# Patient Record
Sex: MALE | Race: WHITE | NOT HISPANIC OR LATINO | Employment: OTHER | URBAN - METROPOLITAN AREA
[De-identification: names, ages, dates, MRNs, and addresses within clinical notes are randomized per-mention and may not be internally consistent; named-entity substitution may affect disease eponyms.]

---

## 2018-01-23 ENCOUNTER — CONVERSION ENCOUNTER (OUTPATIENT)
Dept: PODIATRY | Facility: CLINIC | Age: 83
End: 2018-01-23

## 2018-01-23 ENCOUNTER — PROCEDURE VISIT CONVERTED (OUTPATIENT)
Dept: PODIATRY | Facility: CLINIC | Age: 83
End: 2018-01-23
Attending: PODIATRIST

## 2018-04-04 ENCOUNTER — CONVERSION ENCOUNTER (OUTPATIENT)
Dept: PODIATRY | Facility: CLINIC | Age: 83
End: 2018-04-04

## 2018-04-04 ENCOUNTER — PROCEDURE VISIT CONVERTED (OUTPATIENT)
Dept: PODIATRY | Facility: CLINIC | Age: 83
End: 2018-04-04
Attending: PODIATRIST

## 2018-06-27 ENCOUNTER — CONVERSION ENCOUNTER (OUTPATIENT)
Dept: PODIATRY | Facility: CLINIC | Age: 83
End: 2018-06-27

## 2018-06-27 ENCOUNTER — PROCEDURE VISIT CONVERTED (OUTPATIENT)
Dept: PODIATRY | Facility: CLINIC | Age: 83
End: 2018-06-27
Attending: PODIATRIST

## 2018-09-24 ENCOUNTER — CONVERSION ENCOUNTER (OUTPATIENT)
Dept: PODIATRY | Facility: CLINIC | Age: 83
End: 2018-09-24

## 2018-09-24 ENCOUNTER — OFFICE VISIT CONVERTED (OUTPATIENT)
Dept: PODIATRY | Facility: CLINIC | Age: 83
End: 2018-09-24
Attending: PODIATRIST

## 2018-11-13 ENCOUNTER — OFFICE VISIT CONVERTED (OUTPATIENT)
Dept: INTERNAL MEDICINE | Facility: CLINIC | Age: 83
End: 2018-11-13
Attending: NURSE PRACTITIONER

## 2018-11-13 ENCOUNTER — CONVERSION ENCOUNTER (OUTPATIENT)
Dept: INTERNAL MEDICINE | Facility: CLINIC | Age: 83
End: 2018-11-13

## 2018-12-14 ENCOUNTER — OFFICE VISIT CONVERTED (OUTPATIENT)
Dept: INTERNAL MEDICINE | Facility: CLINIC | Age: 83
End: 2018-12-14
Attending: NURSE PRACTITIONER

## 2018-12-14 ENCOUNTER — CONVERSION ENCOUNTER (OUTPATIENT)
Dept: OTHER | Facility: HOSPITAL | Age: 83
End: 2018-12-14

## 2018-12-17 ENCOUNTER — PROCEDURE VISIT CONVERTED (OUTPATIENT)
Dept: PODIATRY | Facility: CLINIC | Age: 83
End: 2018-12-17
Attending: PODIATRIST

## 2019-01-07 ENCOUNTER — OFFICE VISIT CONVERTED (OUTPATIENT)
Dept: INTERNAL MEDICINE | Facility: CLINIC | Age: 84
End: 2019-01-07
Attending: NURSE PRACTITIONER

## 2019-02-13 ENCOUNTER — OFFICE VISIT CONVERTED (OUTPATIENT)
Dept: NEUROLOGY | Facility: CLINIC | Age: 84
End: 2019-02-13
Attending: PSYCHIATRY & NEUROLOGY

## 2019-03-04 ENCOUNTER — HOSPITAL ENCOUNTER (OUTPATIENT)
Dept: OTHER | Facility: HOSPITAL | Age: 84
Discharge: HOME | End: 2019-03-04
Attending: NURSE PRACTITIONER

## 2019-03-04 LAB
ALBUMIN SERPL-MCNC: 4.3 G/DL (ref 3.5–5)
ALBUMIN/GLOB SERPL: 1.6 {RATIO} (ref 1.4–2.6)
ALP SERPL-CCNC: 118 U/L (ref 56–155)
ALT SERPL-CCNC: 31 U/L (ref 10–40)
ANION GAP SERPL CALC-SCNC: 16 MMOL/L (ref 8–19)
AST SERPL-CCNC: 20 U/L (ref 15–50)
BASOPHILS # BLD AUTO: 0.04 10*3/UL (ref 0–0.2)
BASOPHILS NFR BLD AUTO: 0.6 % (ref 0–3)
BILIRUB SERPL-MCNC: 0.28 MG/DL (ref 0.2–1.3)
BUN SERPL-MCNC: 17 MG/DL (ref 5–25)
BUN/CREAT SERPL: 16 {RATIO} (ref 6–20)
CALCIUM SERPL-MCNC: 9.6 MG/DL (ref 8.7–10.4)
CHLORIDE SERPL-SCNC: 104 MMOL/L (ref 99–111)
CHOLEST SERPL-MCNC: 161 MG/DL (ref 107–200)
CHOLEST/HDLC SERPL: 2.5 {RATIO} (ref 3–6)
CONV ABS IMM GRAN: 0.01 10*3/UL (ref 0–0.2)
CONV CO2: 29 MMOL/L (ref 22–32)
CONV IMMATURE GRAN: 0.2 % (ref 0–1.8)
CONV TOTAL PROTEIN: 7 G/DL (ref 6.3–8.2)
CREAT UR-MCNC: 1.05 MG/DL (ref 0.7–1.2)
DEPRECATED RDW RBC AUTO: 46.4 FL (ref 35.1–43.9)
EOSINOPHIL # BLD AUTO: 0.42 10*3/UL (ref 0–0.7)
EOSINOPHIL # BLD AUTO: 6.5 % (ref 0–7)
ERYTHROCYTE [DISTWIDTH] IN BLOOD BY AUTOMATED COUNT: 12.5 % (ref 11.6–14.4)
EST. AVERAGE GLUCOSE BLD GHB EST-MCNC: 131 MG/DL
GFR SERPLBLD BASED ON 1.73 SQ M-ARVRAT: >60 ML/MIN/{1.73_M2}
GLOBULIN UR ELPH-MCNC: 2.7 G/DL (ref 2–3.5)
GLUCOSE SERPL-MCNC: 105 MG/DL (ref 70–99)
HBA1C MFR BLD: 14.3 G/DL (ref 14–18)
HBA1C MFR BLD: 6.2 % (ref 3.5–5.7)
HCT VFR BLD AUTO: 43.3 % (ref 42–52)
HDLC SERPL-MCNC: 65 MG/DL (ref 40–60)
LDLC SERPL CALC-MCNC: 71 MG/DL (ref 70–100)
LYMPHOCYTES # BLD AUTO: 2.51 10*3/UL (ref 1–5)
MCH RBC QN AUTO: 33.1 PG (ref 27–31)
MCHC RBC AUTO-ENTMCNC: 33 G/DL (ref 33–37)
MCV RBC AUTO: 100.2 FL (ref 80–96)
MONOCYTES # BLD AUTO: 0.44 10*3/UL (ref 0.2–1.2)
MONOCYTES NFR BLD AUTO: 6.8 % (ref 3–10)
NEUTROPHILS # BLD AUTO: 3.09 10*3/UL (ref 2–8)
NEUTROPHILS NFR BLD AUTO: 47.3 % (ref 30–85)
NRBC CBCN: 0 % (ref 0–0.7)
OSMOLALITY SERPL CALC.SUM OF ELEC: 300 MOSM/KG (ref 273–304)
PLATELET # BLD AUTO: 143 10*3/UL (ref 130–400)
PMV BLD AUTO: 10.8 FL (ref 9.4–12.4)
POTASSIUM SERPL-SCNC: 5 MMOL/L (ref 3.5–5.3)
RBC # BLD AUTO: 4.32 10*6/UL (ref 4.7–6.1)
SODIUM SERPL-SCNC: 144 MMOL/L (ref 135–147)
T4 FREE SERPL-MCNC: 0.9 NG/DL (ref 0.9–1.8)
TRIGL SERPL-MCNC: 125 MG/DL (ref 40–150)
TSH SERPL-ACNC: 5.09 M[IU]/L (ref 0.27–4.2)
VARIANT LYMPHS NFR BLD MANUAL: 38.6 % (ref 20–45)
VLDLC SERPL-MCNC: 25 MG/DL (ref 5–37)
WBC # BLD AUTO: 6.51 10*3/UL (ref 4.8–10.8)

## 2019-03-06 ENCOUNTER — HOSPITAL ENCOUNTER (OUTPATIENT)
Dept: CARDIOLOGY | Facility: HOSPITAL | Age: 84
Discharge: HOME | End: 2019-03-06

## 2019-03-07 ENCOUNTER — OFFICE VISIT CONVERTED (OUTPATIENT)
Dept: INTERNAL MEDICINE | Facility: CLINIC | Age: 84
End: 2019-03-07
Attending: NURSE PRACTITIONER

## 2019-03-07 ENCOUNTER — CONVERSION ENCOUNTER (OUTPATIENT)
Dept: INTERNAL MEDICINE | Facility: CLINIC | Age: 84
End: 2019-03-07

## 2019-03-07 ENCOUNTER — HOSPITAL ENCOUNTER (OUTPATIENT)
Dept: OTHER | Facility: HOSPITAL | Age: 84
Discharge: HOME | End: 2019-03-07
Attending: NURSE PRACTITIONER

## 2019-03-07 LAB
T4 FREE SERPL-MCNC: 1 NG/DL (ref 0.9–1.8)
TSH SERPL-ACNC: 3.06 M[IU]/L (ref 0.27–4.2)

## 2019-03-09 LAB — T3 SERPL-MCNC: 90 NG/DL (ref 71–180)

## 2019-03-11 LAB
CONV ANTI MICROSOMAL AB: 11 IU/ML (ref 0–34)
THYROGLOBULIN ANTIBODY: <1 IU/ML (ref 0–0.9)

## 2019-03-18 ENCOUNTER — PROCEDURE VISIT CONVERTED (OUTPATIENT)
Dept: PODIATRY | Facility: CLINIC | Age: 84
End: 2019-03-18
Attending: PODIATRIST

## 2019-06-10 ENCOUNTER — PROCEDURE VISIT CONVERTED (OUTPATIENT)
Dept: PODIATRY | Facility: CLINIC | Age: 84
End: 2019-06-10
Attending: PODIATRIST

## 2019-08-21 ENCOUNTER — OFFICE VISIT CONVERTED (OUTPATIENT)
Dept: NEUROLOGY | Facility: CLINIC | Age: 84
End: 2019-08-21
Attending: PSYCHIATRY & NEUROLOGY

## 2019-08-26 ENCOUNTER — PROCEDURE VISIT CONVERTED (OUTPATIENT)
Dept: PODIATRY | Facility: CLINIC | Age: 84
End: 2019-08-26
Attending: PODIATRIST

## 2019-08-26 ENCOUNTER — CONVERSION ENCOUNTER (OUTPATIENT)
Dept: PODIATRY | Facility: CLINIC | Age: 84
End: 2019-08-26

## 2019-10-07 ENCOUNTER — HOSPITAL ENCOUNTER (OUTPATIENT)
Dept: OTHER | Facility: HOSPITAL | Age: 84
Discharge: HOME | End: 2019-10-07
Attending: NURSE PRACTITIONER

## 2019-10-07 LAB
ALBUMIN SERPL-MCNC: 4.3 G/DL (ref 3.5–5)
ALBUMIN/GLOB SERPL: 1.8 {RATIO} (ref 1.4–2.6)
ALP SERPL-CCNC: 107 U/L (ref 56–155)
ALT SERPL-CCNC: 33 U/L (ref 10–40)
ANION GAP SERPL CALC-SCNC: 19 MMOL/L (ref 8–19)
AST SERPL-CCNC: 20 U/L (ref 15–50)
BASOPHILS # BLD AUTO: 0.04 10*3/UL (ref 0–0.2)
BASOPHILS NFR BLD AUTO: 0.7 % (ref 0–3)
BILIRUB SERPL-MCNC: 0.3 MG/DL (ref 0.2–1.3)
BUN SERPL-MCNC: 17 MG/DL (ref 5–25)
BUN/CREAT SERPL: 17 {RATIO} (ref 6–20)
CALCIUM SERPL-MCNC: 9.4 MG/DL (ref 8.7–10.4)
CHLORIDE SERPL-SCNC: 102 MMOL/L (ref 99–111)
CHOLEST SERPL-MCNC: 161 MG/DL (ref 107–200)
CHOLEST/HDLC SERPL: 2.5 {RATIO} (ref 3–6)
CONV ABS IMM GRAN: 0.02 10*3/UL (ref 0–0.2)
CONV CO2: 26 MMOL/L (ref 22–32)
CONV CREATININE URINE, RANDOM: 56.2 MG/DL (ref 10–300)
CONV IMMATURE GRAN: 0.3 % (ref 0–1.8)
CONV MICROALBUM.,U,RANDOM: 19.1 MG/L (ref 0–20)
CONV TOTAL PROTEIN: 6.7 G/DL (ref 6.3–8.2)
CREAT UR-MCNC: 1.02 MG/DL (ref 0.7–1.2)
DEPRECATED RDW RBC AUTO: 49 FL (ref 35.1–43.9)
EOSINOPHIL # BLD AUTO: 0.43 10*3/UL (ref 0–0.7)
EOSINOPHIL # BLD AUTO: 7.1 % (ref 0–7)
ERYTHROCYTE [DISTWIDTH] IN BLOOD BY AUTOMATED COUNT: 13.4 % (ref 11.6–14.4)
GFR SERPLBLD BASED ON 1.73 SQ M-ARVRAT: >60 ML/MIN/{1.73_M2}
GLOBULIN UR ELPH-MCNC: 2.4 G/DL (ref 2–3.5)
GLUCOSE SERPL-MCNC: 105 MG/DL (ref 70–99)
HCT VFR BLD AUTO: 39.8 % (ref 42–52)
HDLC SERPL-MCNC: 65 MG/DL (ref 40–60)
HGB BLD-MCNC: 13 G/DL (ref 14–18)
LDLC SERPL CALC-MCNC: 72 MG/DL (ref 70–100)
LYMPHOCYTES # BLD AUTO: 2.34 10*3/UL (ref 1–5)
LYMPHOCYTES NFR BLD AUTO: 38.4 % (ref 20–45)
MCH RBC QN AUTO: 32.3 PG (ref 27–31)
MCHC RBC AUTO-ENTMCNC: 32.7 G/DL (ref 33–37)
MCV RBC AUTO: 99 FL (ref 80–96)
MICROALBUMIN/CREAT UR: 34 MG/G{CRE} (ref 0–25)
MONOCYTES # BLD AUTO: 0.46 10*3/UL (ref 0.2–1.2)
MONOCYTES NFR BLD AUTO: 7.6 % (ref 3–10)
NEUTROPHILS # BLD AUTO: 2.8 10*3/UL (ref 2–8)
NEUTROPHILS NFR BLD AUTO: 45.9 % (ref 30–85)
NRBC CBCN: 0 % (ref 0–0.7)
OSMOLALITY SERPL CALC.SUM OF ELEC: 296 MOSM/KG (ref 273–304)
PLATELET # BLD AUTO: 133 10*3/UL (ref 130–400)
PMV BLD AUTO: 10.5 FL (ref 9.4–12.4)
POTASSIUM SERPL-SCNC: 4.5 MMOL/L (ref 3.5–5.3)
RBC # BLD AUTO: 4.02 10*6/UL (ref 4.7–6.1)
SODIUM SERPL-SCNC: 142 MMOL/L (ref 135–147)
T4 FREE SERPL-MCNC: 0.9 NG/DL (ref 0.9–1.8)
TRIGL SERPL-MCNC: 120 MG/DL (ref 40–150)
TSH SERPL-ACNC: 5.2 M[IU]/L (ref 0.27–4.2)
VLDLC SERPL-MCNC: 24 MG/DL (ref 5–37)
WBC # BLD AUTO: 6.09 10*3/UL (ref 4.8–10.8)

## 2019-10-14 ENCOUNTER — OFFICE VISIT CONVERTED (OUTPATIENT)
Dept: INTERNAL MEDICINE | Facility: CLINIC | Age: 84
End: 2019-10-14
Attending: NURSE PRACTITIONER

## 2019-11-11 ENCOUNTER — PROCEDURE VISIT CONVERTED (OUTPATIENT)
Dept: PODIATRY | Facility: CLINIC | Age: 84
End: 2019-11-11
Attending: PODIATRIST

## 2019-11-11 ENCOUNTER — CONVERSION ENCOUNTER (OUTPATIENT)
Dept: PODIATRY | Facility: CLINIC | Age: 84
End: 2019-11-11

## 2019-11-25 ENCOUNTER — HOSPITAL ENCOUNTER (OUTPATIENT)
Dept: OTHER | Facility: HOSPITAL | Age: 84
Discharge: HOME | End: 2019-11-25
Attending: NURSE PRACTITIONER

## 2019-11-25 LAB
BASOPHILS # BLD AUTO: 0.05 10*3/UL (ref 0–0.2)
BASOPHILS NFR BLD AUTO: 0.8 % (ref 0–3)
CONV ABS IMM GRAN: 0.02 10*3/UL (ref 0–0.2)
CONV IMMATURE GRAN: 0.3 % (ref 0–1.8)
DEPRECATED RDW RBC AUTO: 49.4 FL (ref 35.1–43.9)
EOSINOPHIL # BLD AUTO: 0.45 10*3/UL (ref 0–0.7)
EOSINOPHIL # BLD AUTO: 7.3 % (ref 0–7)
ERYTHROCYTE [DISTWIDTH] IN BLOOD BY AUTOMATED COUNT: 13.3 % (ref 11.6–14.4)
FOLATE SERPL-MCNC: >20 NG/ML (ref 4.8–20)
HCT VFR BLD AUTO: 39.8 % (ref 42–52)
HGB BLD-MCNC: 12.9 G/DL (ref 14–18)
IRON SATN MFR SERPL: 55 % (ref 20–55)
IRON SERPL-MCNC: 143 UG/DL (ref 70–180)
LYMPHOCYTES # BLD AUTO: 2.56 10*3/UL (ref 1–5)
LYMPHOCYTES NFR BLD AUTO: 41.4 % (ref 20–45)
MCH RBC QN AUTO: 32.4 PG (ref 27–31)
MCHC RBC AUTO-ENTMCNC: 32.4 G/DL (ref 33–37)
MCV RBC AUTO: 100 FL (ref 80–96)
MONOCYTES # BLD AUTO: 0.52 10*3/UL (ref 0.2–1.2)
MONOCYTES NFR BLD AUTO: 8.4 % (ref 3–10)
NEUTROPHILS # BLD AUTO: 2.59 10*3/UL (ref 2–8)
NEUTROPHILS NFR BLD AUTO: 41.8 % (ref 30–85)
NRBC CBCN: 0 % (ref 0–0.7)
PLATELET # BLD AUTO: 151 10*3/UL (ref 130–400)
PMV BLD AUTO: 10.5 FL (ref 9.4–12.4)
RBC # BLD AUTO: 3.98 10*6/UL (ref 4.7–6.1)
T4 FREE SERPL-MCNC: 0.9 NG/DL (ref 0.9–1.8)
TIBC SERPL-MCNC: 262 UG/DL (ref 245–450)
TRANSFERRIN SERPL-MCNC: 183 MG/DL (ref 215–365)
TSH SERPL-ACNC: 3.91 M[IU]/L (ref 0.27–4.2)
VIT B12 SERPL-MCNC: 980 PG/ML (ref 211–911)
WBC # BLD AUTO: 6.19 10*3/UL (ref 4.8–10.8)

## 2020-02-03 ENCOUNTER — PROCEDURE VISIT CONVERTED (OUTPATIENT)
Dept: PODIATRY | Facility: CLINIC | Age: 85
End: 2020-02-03
Attending: PODIATRIST

## 2020-04-21 ENCOUNTER — TELEMEDICINE CONVERTED (OUTPATIENT)
Dept: INTERNAL MEDICINE | Facility: CLINIC | Age: 85
End: 2020-04-21
Attending: NURSE PRACTITIONER

## 2020-05-04 ENCOUNTER — TELEMEDICINE CONVERTED (OUTPATIENT)
Dept: INTERNAL MEDICINE | Facility: CLINIC | Age: 85
End: 2020-05-04
Attending: NURSE PRACTITIONER

## 2020-06-22 ENCOUNTER — PROCEDURE VISIT CONVERTED (OUTPATIENT)
Dept: PODIATRY | Facility: CLINIC | Age: 85
End: 2020-06-22
Attending: PODIATRIST

## 2020-07-08 ENCOUNTER — OFFICE VISIT CONVERTED (OUTPATIENT)
Dept: NEUROLOGY | Facility: CLINIC | Age: 85
End: 2020-07-08
Attending: PSYCHIATRY & NEUROLOGY

## 2020-07-23 ENCOUNTER — OFFICE VISIT CONVERTED (OUTPATIENT)
Dept: INTERNAL MEDICINE | Facility: CLINIC | Age: 85
End: 2020-07-23
Attending: PHYSICIAN ASSISTANT

## 2020-07-23 ENCOUNTER — HOSPITAL ENCOUNTER (OUTPATIENT)
Dept: OTHER | Facility: HOSPITAL | Age: 85
Discharge: HOME | End: 2020-07-23
Attending: PHYSICIAN ASSISTANT

## 2020-07-23 LAB — T4 FREE SERPL-MCNC: 1 NG/DL (ref 0.9–1.8)

## 2020-07-24 LAB
ALBUMIN SERPL-MCNC: 4.5 G/DL (ref 3.5–5)
ALBUMIN/GLOB SERPL: 1.6 {RATIO} (ref 1.4–2.6)
ALP SERPL-CCNC: 116 U/L (ref 56–155)
ALT SERPL-CCNC: 48 U/L (ref 10–40)
ANION GAP SERPL CALC-SCNC: 22 MMOL/L (ref 8–19)
AST SERPL-CCNC: 28 U/L (ref 15–50)
BASOPHILS # BLD AUTO: 0.03 10*3/UL (ref 0–0.2)
BASOPHILS NFR BLD AUTO: 0.5 % (ref 0–3)
BILIRUB SERPL-MCNC: 0.24 MG/DL (ref 0.2–1.3)
BUN SERPL-MCNC: 21 MG/DL (ref 5–25)
BUN/CREAT SERPL: 17 {RATIO} (ref 6–20)
CALCIUM SERPL-MCNC: 9.6 MG/DL (ref 8.7–10.4)
CHLORIDE SERPL-SCNC: 104 MMOL/L (ref 99–111)
CHOLEST SERPL-MCNC: 162 MG/DL (ref 107–200)
CHOLEST/HDLC SERPL: 3.1 {RATIO} (ref 3–6)
CONV ABS IMM GRAN: 0.02 10*3/UL (ref 0–0.2)
CONV CO2: 23 MMOL/L (ref 22–32)
CONV IMMATURE GRAN: 0.3 % (ref 0–1.8)
CONV TOTAL PROTEIN: 7.3 G/DL (ref 6.3–8.2)
CREAT UR-MCNC: 1.26 MG/DL (ref 0.7–1.2)
DEPRECATED RDW RBC AUTO: 50.4 FL (ref 35.1–43.9)
EOSINOPHIL # BLD AUTO: 0.28 10*3/UL (ref 0–0.7)
EOSINOPHIL # BLD AUTO: 4.7 % (ref 0–7)
ERYTHROCYTE [DISTWIDTH] IN BLOOD BY AUTOMATED COUNT: 13.3 % (ref 11.6–14.4)
EST. AVERAGE GLUCOSE BLD GHB EST-MCNC: 137 MG/DL
GFR SERPLBLD BASED ON 1.73 SQ M-ARVRAT: 51 ML/MIN/{1.73_M2}
GLOBULIN UR ELPH-MCNC: 2.8 G/DL (ref 2–3.5)
GLUCOSE SERPL-MCNC: 166 MG/DL (ref 70–99)
HBA1C MFR BLD: 6.4 % (ref 3.5–5.7)
HCT VFR BLD AUTO: 43.6 % (ref 42–52)
HDLC SERPL-MCNC: 52 MG/DL (ref 40–60)
HGB BLD-MCNC: 13.6 G/DL (ref 14–18)
LDLC SERPL CALC-MCNC: 73 MG/DL (ref 70–100)
LYMPHOCYTES # BLD AUTO: 1.86 10*3/UL (ref 1–5)
LYMPHOCYTES NFR BLD AUTO: 31.5 % (ref 20–45)
MCH RBC QN AUTO: 31.9 PG (ref 27–31)
MCHC RBC AUTO-ENTMCNC: 31.2 G/DL (ref 33–37)
MCV RBC AUTO: 102.1 FL (ref 80–96)
MONOCYTES # BLD AUTO: 0.42 10*3/UL (ref 0.2–1.2)
MONOCYTES NFR BLD AUTO: 7.1 % (ref 3–10)
NEUTROPHILS # BLD AUTO: 3.3 10*3/UL (ref 2–8)
NEUTROPHILS NFR BLD AUTO: 55.9 % (ref 30–85)
NRBC CBCN: 0 % (ref 0–0.7)
OSMOLALITY SERPL CALC.SUM OF ELEC: 305 MOSM/KG (ref 273–304)
PLATELET # BLD AUTO: 158 10*3/UL (ref 130–400)
PMV BLD AUTO: 10.3 FL (ref 9.4–12.4)
POTASSIUM SERPL-SCNC: 4.6 MMOL/L (ref 3.5–5.3)
PSA SERPL-MCNC: 3.05 NG/ML (ref 0–4)
RBC # BLD AUTO: 4.27 10*6/UL (ref 4.7–6.1)
SODIUM SERPL-SCNC: 144 MMOL/L (ref 135–147)
TRIGL SERPL-MCNC: 183 MG/DL (ref 40–150)
TSH SERPL-ACNC: 2.75 M[IU]/L (ref 0.27–4.2)
VLDLC SERPL-MCNC: 37 MG/DL (ref 5–37)
WBC # BLD AUTO: 5.91 10*3/UL (ref 4.8–10.8)

## 2020-07-25 LAB — BACTERIA UR CULT: NORMAL

## 2020-07-27 LAB
FERRITIN SERPL-MCNC: 112 NG/ML (ref 30–300)
FOLATE SERPL-MCNC: >20 NG/ML (ref 4.8–20)
IRON SATN MFR SERPL: 54 % (ref 20–55)
IRON SERPL-MCNC: 141 UG/DL (ref 70–180)
TIBC SERPL-MCNC: 259 UG/DL (ref 245–450)
TRANSFERRIN SERPL-MCNC: 181 MG/DL (ref 215–365)
VIT B12 SERPL-MCNC: 1064 PG/ML (ref 211–911)

## 2020-09-21 ENCOUNTER — PROCEDURE VISIT CONVERTED (OUTPATIENT)
Dept: PODIATRY | Facility: CLINIC | Age: 85
End: 2020-09-21
Attending: PODIATRIST

## 2020-09-23 ENCOUNTER — CONVERSION ENCOUNTER (OUTPATIENT)
Dept: SURGERY | Facility: CLINIC | Age: 85
End: 2020-09-23

## 2020-09-23 ENCOUNTER — OFFICE VISIT CONVERTED (OUTPATIENT)
Dept: UROLOGY | Facility: CLINIC | Age: 85
End: 2020-09-23
Attending: UROLOGY

## 2020-11-09 ENCOUNTER — TELEPHONE CONVERTED (OUTPATIENT)
Dept: INTERNAL MEDICINE | Facility: CLINIC | Age: 85
End: 2020-11-09
Attending: NURSE PRACTITIONER

## 2020-12-14 ENCOUNTER — PROCEDURE VISIT CONVERTED (OUTPATIENT)
Dept: PODIATRY | Facility: CLINIC | Age: 85
End: 2020-12-14
Attending: PODIATRIST

## 2020-12-28 ENCOUNTER — HOSPITAL ENCOUNTER (OUTPATIENT)
Dept: URGENT CARE | Facility: CLINIC | Age: 85
Discharge: HOME | End: 2020-12-28
Attending: FAMILY MEDICINE

## 2020-12-31 ENCOUNTER — HOSPITAL ENCOUNTER (OUTPATIENT)
Dept: OTHER | Facility: HOSPITAL | Age: 85
Discharge: HOME | End: 2020-12-31
Attending: STUDENT IN AN ORGANIZED HEALTH CARE EDUCATION/TRAINING PROGRAM

## 2020-12-31 LAB
25(OH)D3 SERPL-MCNC: 45 NG/ML (ref 30–100)
ALBUMIN SERPL-MCNC: 4 G/DL (ref 3.5–5)
ALBUMIN/GLOB SERPL: 1.4 {RATIO} (ref 1.4–2.6)
ALP SERPL-CCNC: 137 U/L (ref 56–155)
ALT SERPL-CCNC: 41 U/L (ref 10–40)
ANION GAP SERPL CALC-SCNC: 18 MMOL/L (ref 8–19)
AST SERPL-CCNC: 27 U/L (ref 15–50)
BASOPHILS # BLD AUTO: 0.07 10*3/UL (ref 0–0.2)
BASOPHILS NFR BLD AUTO: 0.7 % (ref 0–3)
BILIRUB SERPL-MCNC: 0.4 MG/DL (ref 0.2–1.3)
BUN SERPL-MCNC: 32 MG/DL (ref 5–25)
BUN/CREAT SERPL: 21 {RATIO} (ref 6–20)
CALCIUM SERPL-MCNC: 9.1 MG/DL (ref 8.7–10.4)
CHLORIDE SERPL-SCNC: 103 MMOL/L (ref 99–111)
CONV ABS IMM GRAN: 0.04 10*3/UL (ref 0–0.2)
CONV CO2: 25 MMOL/L (ref 22–32)
CONV IMMATURE GRAN: 0.4 % (ref 0–1.8)
CONV TOTAL PROTEIN: 6.9 G/DL (ref 6.3–8.2)
CREAT UR-MCNC: 1.55 MG/DL (ref 0.7–1.2)
DEPRECATED RDW RBC AUTO: 52.3 FL (ref 35.1–43.9)
EOSINOPHIL # BLD AUTO: 0.63 10*3/UL (ref 0–0.7)
EOSINOPHIL # BLD AUTO: 6 % (ref 0–7)
ERYTHROCYTE [DISTWIDTH] IN BLOOD BY AUTOMATED COUNT: 14 % (ref 11.6–14.4)
GFR SERPLBLD BASED ON 1.73 SQ M-ARVRAT: 39 ML/MIN/{1.73_M2}
GLOBULIN UR ELPH-MCNC: 2.9 G/DL (ref 2–3.5)
GLUCOSE SERPL-MCNC: 177 MG/DL (ref 70–99)
HCT VFR BLD AUTO: 41.3 % (ref 42–52)
HGB BLD-MCNC: 13.3 G/DL (ref 14–18)
LYMPHOCYTES # BLD AUTO: 2.08 10*3/UL (ref 1–5)
LYMPHOCYTES NFR BLD AUTO: 19.7 % (ref 20–45)
MCH RBC QN AUTO: 32.4 PG (ref 27–31)
MCHC RBC AUTO-ENTMCNC: 32.2 G/DL (ref 33–37)
MCV RBC AUTO: 100.7 FL (ref 80–96)
MONOCYTES # BLD AUTO: 0.93 10*3/UL (ref 0.2–1.2)
MONOCYTES NFR BLD AUTO: 8.8 % (ref 3–10)
NEUTROPHILS # BLD AUTO: 6.81 10*3/UL (ref 2–8)
NEUTROPHILS NFR BLD AUTO: 64.4 % (ref 30–85)
NRBC CBCN: 0 % (ref 0–0.7)
OSMOLALITY SERPL CALC.SUM OF ELEC: 305 MOSM/KG (ref 273–304)
PLATELET # BLD AUTO: 125 10*3/UL (ref 130–400)
PMV BLD AUTO: 11.5 FL (ref 9.4–12.4)
POTASSIUM SERPL-SCNC: 4.2 MMOL/L (ref 3.5–5.3)
RBC # BLD AUTO: 4.1 10*6/UL (ref 4.7–6.1)
SODIUM SERPL-SCNC: 142 MMOL/L (ref 135–147)
T4 FREE SERPL-MCNC: 1.1 NG/DL (ref 0.9–1.8)
TSH SERPL-ACNC: 5.03 M[IU]/L (ref 0.27–4.2)
WBC # BLD AUTO: 10.56 10*3/UL (ref 4.8–10.8)

## 2021-01-09 LAB
BIOAVAILABLE TESTOSTERONE, %: 13.5 %
CONV TESTOSTERONE, FREE: 7.8 PG/ML
SHBG SERPL-SCNC: 67.3 NMOL/L
TESTOST FREE MFR SERPL: 0.8 %
TESTOST SERPL-MCNC: 98 NG/DL
TESTOSTERONE.FREE+WB SERPL-MCNC: 13 NG/DL

## 2021-01-12 ENCOUNTER — OFFICE VISIT CONVERTED (OUTPATIENT)
Dept: NEUROSURGERY | Facility: CLINIC | Age: 86
End: 2021-01-12
Attending: NEUROLOGICAL SURGERY

## 2021-01-12 ENCOUNTER — CONVERSION ENCOUNTER (OUTPATIENT)
Dept: NEUROLOGY | Facility: CLINIC | Age: 86
End: 2021-01-12

## 2021-01-13 ENCOUNTER — HOSPITAL ENCOUNTER (OUTPATIENT)
Dept: MRI IMAGING | Facility: HOSPITAL | Age: 86
Discharge: HOME | End: 2021-01-13
Attending: NEUROLOGICAL SURGERY

## 2021-01-14 ENCOUNTER — HOSPITAL ENCOUNTER (OUTPATIENT)
Dept: OTHER | Facility: HOSPITAL | Age: 86
Discharge: HOME | End: 2021-01-14
Attending: STUDENT IN AN ORGANIZED HEALTH CARE EDUCATION/TRAINING PROGRAM

## 2021-01-14 LAB
ANION GAP SERPL CALC-SCNC: 17 MMOL/L (ref 8–19)
BUN SERPL-MCNC: 15 MG/DL (ref 5–25)
BUN/CREAT SERPL: 14 {RATIO} (ref 6–20)
CALCIUM SERPL-MCNC: 9.7 MG/DL (ref 8.7–10.4)
CHLORIDE SERPL-SCNC: 100 MMOL/L (ref 99–111)
CONV CO2: 25 MMOL/L (ref 22–32)
CREAT UR-MCNC: 1.04 MG/DL (ref 0.7–1.2)
FOLATE SERPL-MCNC: >20 NG/ML (ref 4.8–20)
GFR SERPLBLD BASED ON 1.73 SQ M-ARVRAT: >60 ML/MIN/{1.73_M2}
GLUCOSE SERPL-MCNC: 139 MG/DL (ref 70–99)
OSMOLALITY SERPL CALC.SUM OF ELEC: 289 MOSM/KG (ref 273–304)
POTASSIUM SERPL-SCNC: 4.4 MMOL/L (ref 3.5–5.3)
SODIUM SERPL-SCNC: 138 MMOL/L (ref 135–147)
VIT B12 SERPL-MCNC: 1620 PG/ML (ref 211–911)

## 2021-01-15 ENCOUNTER — HOSPITAL ENCOUNTER (OUTPATIENT)
Dept: OTHER | Facility: HOSPITAL | Age: 86
Discharge: HOME | End: 2021-01-15
Attending: STUDENT IN AN ORGANIZED HEALTH CARE EDUCATION/TRAINING PROGRAM

## 2021-01-15 LAB
APPEARANCE UR: CLEAR
BILIRUB UR QL: NEGATIVE
COLOR UR: YELLOW
CONV COLLECTION SOURCE (UA): NORMAL
CONV UROBILINOGEN IN URINE BY AUTOMATED TEST STRIP: 0.2 {EHRLICHU}/DL (ref 0.1–1)
GLUCOSE UR QL: NEGATIVE MG/DL
HGB UR QL STRIP: NEGATIVE
KETONES UR QL STRIP: NEGATIVE MG/DL
LEUKOCYTE ESTERASE UR QL STRIP: NEGATIVE
NITRITE UR QL STRIP: NEGATIVE
PH UR STRIP.AUTO: 6.5 [PH] (ref 5–8)
PROT UR QL: NORMAL MG/DL
SP GR UR: 1.01 (ref 1–1.03)

## 2021-01-17 LAB — BACTERIA UR CULT: NORMAL

## 2021-01-26 ENCOUNTER — TELEMEDICINE CONVERTED (OUTPATIENT)
Dept: INTERNAL MEDICINE | Facility: CLINIC | Age: 86
End: 2021-01-26
Attending: STUDENT IN AN ORGANIZED HEALTH CARE EDUCATION/TRAINING PROGRAM

## 2021-03-08 ENCOUNTER — OFFICE VISIT CONVERTED (OUTPATIENT)
Dept: NEUROLOGY | Facility: CLINIC | Age: 86
End: 2021-03-08
Attending: PSYCHIATRY & NEUROLOGY

## 2021-03-09 ENCOUNTER — PROCEDURE VISIT CONVERTED (OUTPATIENT)
Dept: PODIATRY | Facility: CLINIC | Age: 86
End: 2021-03-09
Attending: PODIATRIST

## 2021-03-10 ENCOUNTER — HOSPITAL ENCOUNTER (OUTPATIENT)
Dept: GENERAL RADIOLOGY | Facility: HOSPITAL | Age: 86
Discharge: HOME | End: 2021-03-10
Attending: STUDENT IN AN ORGANIZED HEALTH CARE EDUCATION/TRAINING PROGRAM

## 2021-04-01 ENCOUNTER — TELEPHONE CONVERTED (OUTPATIENT)
Dept: INTERNAL MEDICINE | Facility: CLINIC | Age: 86
End: 2021-04-01
Attending: NURSE PRACTITIONER

## 2021-04-08 ENCOUNTER — HOSPITAL ENCOUNTER (OUTPATIENT)
Dept: INFUSION THERAPY | Facility: HOSPITAL | Age: 86
Discharge: HOME | End: 2021-04-08
Attending: NURSE PRACTITIONER

## 2021-04-08 LAB
CALCIUM SERPL-MCNC: 9 MG/DL (ref 8.7–10.4)
CREAT UR-MCNC: 1.08 MG/DL (ref 0.7–1.2)

## 2021-05-10 NOTE — H&P
History and Physical      Patient Name: Sage Michael   Patient ID: [id number]   Sex: Male   YOB: 1932    Primary Care Provider: Laura GTZ   Referring Provider: Luis Santiago DPM    Visit Date: January 12, 2021    Provider: Jhonatan Landeros MD   Location: [facility]   Location Address: [address] [place] [address]  [id number]   Location Phone: [phone]          Chief Complaint  · Back pain      History Of Present Illness  The patient is a 88 year old /White male, who presents on referral from Luis Santiago DPM, for a neurosurgical evaluation of low back pain.   The pain developed acutely on 12/28/2020. It is mild (0-2/10) in severity has a dull quality and radiates into the bilateral leg in a nonspecific distribution. The pain has been waxing and waning in severity. The pain tends to be less in the evening. The patient states the pain is aggravated by prolonged standing. No alleviating factors are reported.   The patient has no prior history of neck or back surgery.   RECENT INTERVENTIONS:  He has not had any recent treatment for this problem, except as above.   INFORMATION REVIEWED:  The following information was reviewed: radiology reports and images. Lumbar radiographs revealed multiple age indeterminate lumbar fractures (L3 the worst)..       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; Deafness; Degenerative Disc Disease ; Dementia; Diabetes mellitus type 2, noninsulin dependent; Diabetic neuropathy; Diverticula of colon; Foot pain, left; Foot pain, right; Forgetfulness; GERD; Hemorrhoid; Hyperlipidemia; Hypertension; Ingrowing nail; Ingrowing toenail; Lumbago; Neurologic disorder, unspecified; Neuropathy; Parkinson disease; Polyneuropathy; Tinea unguium; Vascular disease, peripheral         Past Surgical History  Aortofemoral vein graft  bypass; Carotid artery stenting; Cataract surgery         Medication List  Alcohol Pads topical pads, medicated; aspirin 81 mg oral tablet,chewable; Calcium 500 + D 500 mg(1,250mg) -200 unit oral tablet; FreeStyle Test miscellaneous strip; Glucosamine-Chondroitin Complx 500-400 mg oral capsule; Januvia 50 mg oral tablet; Lancets,Ultra Thin miscellaneous misc; lisinopril 2.5 mg oral tablet; LSO Brace; Precision Xtra Monitor miscellaneous kit; primidone 50 mg oral tablet; simvastatin 40 mg oral tablet; Synthroid 25 mcg oral tablet; tamsulosin 0.4 mg oral capsule; Vitamins and Minerals oral tablet         Allergy List  NO KNOWN DRUG ALLERGIES       Allergies Reconciled  Family Medical History  Family history of Arthritis; Family history of stroke         Social History  Alcohol (Never); lives with children; Retired; Tobacco (Former);          Review of Systems  · Constitutional  o Admits  o : fatigue  o Denies  o : chills, excessive sweating, fever, sycope/passing out, weight gain, weight loss  · Eyes  o Denies  o : changes in vision, blurry vision, double vision  · HENT  o Denies  o : loss of hearing, ringing in the ears, ear aches, sore throat, nasal congestion, sinus pain, nose bleeds, seasonal allergies  · Cardiovascular  o Admits  o : anemia  o Denies  o : blood clots, swollen legs, easy burising or bleeding, transfusions  · Respiratory  o Denies  o : shortness of breath, dry cough, productive cough, pneumonia, COPD  · Gastrointestinal  o Admits  o : difficulty swallowing  o Denies  o : reflux  · Genitourinary  o Admits  o : incontinence  · Neurologic  o Admits  o : loss of balance, falls, difficulty with sleep, weakness  o Denies  o : headache, seizure, stroke, tremor, dizziness/vertigo, numbness/tingling/paresthesia , difficulty with coordination, difficulty with dexterity  · Musculoskeletal  o Admits  o : joint pain, low back pain  o Denies  o : neck stiffness/pain, swollen lymph nodes, muscle aches,  "weakness, spasms, sciatica, pain radiating in arm, pain radiating in leg  · Endocrine  o Admits  o : diabetes, thyroid disorder  · Psychiatric  o Denies  o : anxiety, depression  · All Others Negative      Vitals  Date Time BP Position Site L\R Cuff Size HR RR TEMP (F) WT  HT  BMI kg/m2 BSA m2 O2 Sat FR L/min FiO2 HC       01/12/2021 04:05 PM        97.9 138lbs 0oz 5'  5\" 22.96 1.69             Physical Examination  · Constitutional  o Appearance  o : well-nourished, well developed, alert, in no acute distress  · Respiratory  o Respiratory Effort  o : breathing unlabored  · Cardiovascular  o Peripheral Vascular System  o :   § Extremities  § : no edema or cyanosis  · Musculoskeletal  o Spine  o :   § Inspection/Palpation  § : no spinal tenderness or misalignment  o Right Lower Extremity  o :   § Inspection/Palpation  § : no joint or limb tenderness to palpation, no edema present, no ecchymosis  § Joint Stability  § : joint stability within normal limits  o Left Lower Extremity  o :   § Inspection/Palpation  § : no joint or limb tenderness to palpation, no edema present, no ecchymosis  § Joint Stability  § : joint stability within normal limits  · Skin and Subcutaneous Tissue  o Extremities  o :   § Right Lower Extremity  § : no lesions or areas of discoloration  § Left Lower Extremity  § : no lesions or areas of discoloration  o Back  o : no lesions or areas of discoloration  · Neurologic  o Mental Status Examination  o :   § Orientation  § : very hard of hearing, answers questions appropriately  o Motor Examination  o :   § RLE Strength  § : strength normal  § RLE Motor Function  § : tone normal, no atrophy, no abnormal movements noted  § LLE Strength  § : strength normal  § LLE Motor Function  § : tone normal, no atrophy, no abnormal movements noted  o Sensation  o :   § Light Touch  § : sensation intact to light touch in extremities  o Gait and Station  o :   § Gait Screening  § : walks with a " walker              Assessment  · Lumbar compression fracture     805.4/S32.000A  Multiple age-indeterminate      Plan  · Orders  o MR lumbar spine wo con (19190) - - 01/12/2021   Lower back pain, fracture on plain film  · Medications  o Medications have been Reconciled  o Transition of Care or Provider Policy  · Instructions  o Encouraged to follow-up with Primary Care Provider for preventative care.  o The ROS and the PFSH were reviewed at today's visit.  o We will obtain a lumbar MRI to date the fractures of the lumbar spine. They will call after the MRI to get the results and to make a treatment plan.             Electronically Signed by: Jhonatan Landeros MD -Author on January 12, 2021 04:25:34 PM

## 2021-05-10 NOTE — H&P
History and Physical      Patient Name: Sage Michael   Patient ID: [id number]   Sex: Male   YOB: 1932    Primary Care Provider: Laura GTZ   Referring Provider: Luis Santiago DPM    Visit Date: September 23, 2020    Provider: Yesy Archibald MD   Location: [facility]   Location Address: [address] [place] [address]  [id number]   Location Phone: [phone]          Chief Complaint  · pt is here for urological concerns      History Of Present Illness  The patient is a 88 year old /White male, who is a consultation from Luis Santiago DPM, for the evaluation of post-void dribbling for the past months. The symptoms are continuous, worsening and mildly bothersome. The patient notes worsening associated with no known aggravating factors. The patient denies straining to urinate and hematuria.   His PSA level has not been obtained. He has had no prior prostate biopsies.   Prior tests to evaluate the symptoms have not been done. Tests today to evaluate the symptoms include bladder scan The bladder scan showed a residual volume of 98 cc.   Patient has been on Flomax for several years. He is on 0.8mg qday. He gets up a few times at night and that is stable. He has no other complaints than his post void dribbling. The patient is very [place] and his daughter is answering most the questions for him. He denies erectile dysfunction.       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; Deafness; Degenerative Disc Disease ; Dementia; Diabetes mellitus type 2, noninsulin dependent; Diabetic neuropathy; Diverticula of colon; Foot pain, left; Foot pain, right; Forgetfulness; GERD; Hemorrhoid; Hyperlipidemia; Hypertension; Ingrowing nail; Ingrowing toenail; Neurologic disorder, unspecified; Neuropathy; Parkinson disease; Polyneuropathy; Tinea unguium; Vascular disease, peripheral  "        Past Surgical History  Aortofemoral vein graft bypass; Carotid artery stenting; Cataract surgery         Medication List  aspirin 81 mg oral tablet,chewable; Calcium 500 + D 500 mg(1,250mg) -200 unit oral tablet; FreeStyle Test miscellaneous strip; Glucosamine-Chondroitin Complx 500-400 mg oral capsule; Januvia 50 mg oral tablet; lisinopril 2.5 mg oral tablet; Precision Xtra Monitor miscellaneous kit; primidone 50 mg oral tablet; simvastatin 40 mg oral tablet; Synthroid 25 mcg oral tablet; tamsulosin 0.4 mg oral capsule; Vitamins and Minerals oral tablet         Allergy List  NO KNOWN DRUG ALLERGIES       Allergies Reconciled  Family Medical History  Family history of Arthritis; Family history of stroke         Social History  Alcohol (Never); lives with children; Retired; Tobacco (Former);          Review of Systems  · Constitutional  o Denies  o : chills, fever  · Gastrointestinal  o Denies  o : nausea, vomiting      Vitals  Date Time BP Position Site L\R Cuff Size HR RR TEMP (F) WT  HT  BMI kg/m2 BSA m2 O2 Sat        09/23/2020 02:17 /53 Sitting       135lbs 0oz 5'  5\" 22.46 1.68           Physical Examination  · Constitutional  o Appearance  o : Well nourished, well developed patient in no acute distress. Ambulating without difficulty.  · Head and Face  o Head  o :   § Inspection  § : atraumatic, normocephalic  o Face  o :   § Inspection  § : no facial lesions  · Eyes  o Sclerae  o : sclerae white  · Ears, Nose, Mouth and Throat  o Ears  o :   § External Ears  § : appearance within normal limits, no lesions present  o Nose  o :   § External Nose  § : appearance normal  · Neck  o Inspection/Palpation  o : normal appearance, trachea midline  · Respiratory  o Inspection of Chest  o : normal appearance, no retractions  · Gastrointestinal  o Abdominal Examination  o : abdomen nontender to palpation, tone normal without rigidity or guarding, no masses present, abdominal contour scaphoid  · Skin " and Subcutaneous Tissue  o General Inspection  o : No rashes, lesions or areas of discoloration present. Skin turgor is normal.  · Neurologic  o Mental Status Examination  o :   § Orientation  § : grossly oriented to person, place and time  § Speech/Language  § : communication ability within normal limits  o Gait and Station  o : normal gait, able to stand without difficulty  · Psychiatric  o Judgement and Insight  o : judgment and insight intact, judgement for everyday activities and social situations within normal limits, insight intact  o Mood and Affect  o : mood normal, affect appropriate          Results  · In-Office Procedures  o Lab procedure  § Automated dipstick urinalysis with microscopy (46786)   § Color Ur: Yellow   § Clarity Ur: Clear   § Glucose Ur Ql Strip: Negative   § Bilirub Ur Ql Strip: Negative   § Ketones Ur Ql Strip: Negative   § Sp Gr Ur Qn: 1.025   § Hgb Ur Ql Strip: Negative   § pH Ur-LsCnc: 6.5   § Prot Ur Ql Strip: 30   § Urobilinogen Ur Strip-mCnc: 0.2   § Nitrite Ur Ql Strip: Negative   § WBC Est Ur Ql Strip: Negative   § RBC UrnS Qn HPF: 0   § WBC UrnS Qn HPF: 0   § Bacteria UrnS Qn HPF: 0   § Crystals UrnS Qn HPF: 0   § Epithelial Cells (non renal): 0 /HPF  § Epithelial Cells (renal): 0   o Surgical procedure  § IOP - Bladder Scan/Residual Urine (20237)   § Specimen vol Ur: 98       Assessment  · Localized BPH with urinary obstruction     600.21    Problems Reconciled  Plan  · Medications  o Medications have been Reconciled  o Transition of Care or Provider Policy  · Instructions  o The patient has symptoms of BPH. I have discussed the diagnosis and options for treatment with him. He is not very bothered by these symptoms and his PVR is normal. He will continue on his Flomax 0.8mg once a day and I will see him in one year. We discussed starting finasteride as well.   o FOLLOW-UP:  o In 12 months  o Electronically Identified Patient Education Materials Provided  Electronically            Electronically Signed by: Yesy Archibald MD -Author on September 23, 2020 02:59:41 PM

## 2021-05-12 NOTE — PROGRESS NOTES
Progress Note      Patient Name: Sage Michael   Patient ID: [id number]   Sex: Male   YOB: 1932    Primary Care Provider: Laura GTZ   Referring Provider: Luis Santiago DPM    Visit Date: April 21, 2020    Provider: ULISSES Kumar   Location: [facility]   Location Address: [address] [place] [address]  [place], [place]  [id number]   Location Phone: [phone]          History Of Present Illness  Video Conferencing Visit  Sage Michael is a 87 year old /White male who is presenting for evaluation via video conferencing. Verbal consent obtained before beginning visit.   The following staff were present during this visit: Laura Kemp NP; Sonal Novak rad tech      doing well with social isolation    has had appt with Dr. Santiago, no issues at this time    not checking bp regularly    DM fasting BS 90s, no low bs    memory-no significant change reported from daughter    daughter and patient present during visit    via zoom     Sage Michael is a 87 year old /White male who presents for evaluation and treatment of:       Past Medical History  Disease Name Date Onset Notes   Arthritis --  --    Broken Bones --  --    Cataract --  --    Colon abnormality --  --    Controlled type 1 diabetes mellitus with diabetic polyneuropathy 07/25/2017 --    Deafness --  --    Degenerative Disc Disease  --  --    Dementia --  --    Diabetes mellitus type 2, noninsulin dependent --  --    Diabetic neuropathy 05/12/2016 --    Diverticula of colon --  --    Foot pain, left 01/23/2018 --    Foot pain, right 01/23/2018 --    Forgetfulness --  --    GERD --  --    Hemorrhoid --  --    Hyperlipidemia 05/12/2016 --    Hypertension --  --    Ingrowing nail 06/27/2018 --    Ingrowing toenail 09/24/2018 --    Neurologic disorder, unspecified --  --    Neuropathy --  --    Parkinson disease 05/12/2016 --    Polyneuropathy 07/25/2017 --    Tinea unguium 07/25/2017 --     Vascular disease, peripheral --  --          Past Surgical History  Procedure Name Date Notes   Carotid artery stenting --  --    Cataract surgery --  --          Medication List  Name Date Started Instructions   aspirin 81 mg oral tablet,chewable  chew 1 tablet (81 mg) by oral route once daily   Calcium 500 + D 500 mg(1,250mg) -200 unit oral tablet  take 1 tablet by oral route daily   FreeStyle Test miscellaneous strip 04/21/2020 Freestyle [place] lite strips. Use Dx:E11.9 QID PRN   Glucosamine-Chondroitin Complx 500-400 mg oral capsule  take 3 capsules by oral route 3 times a day   Januvia 50 mg oral tablet  take 1 tablet (50 mg) by oral route once daily   lisinopril 2.5 mg oral tablet  take 1 tablet (2.5 mg) by oral route once daily   Precision Xtra Monitor miscellaneous kit 03/04/2020 test once a day DX Diabetes   primidone 50 mg oral tablet  take 2 tablets (100 mg) by oral route 3 times per day   simvastatin 40 mg oral tablet  take 1 tablet (40 mg) by oral route once daily in the evening   Synthroid 25 mcg oral tablet 03/20/2020 take 1 tablet (25 mcg) by oral route once daily   tamsulosin 0.4 mg oral capsule  take 2 capsules (0.8 mg) by oral route once daily 1/2 hour following the same meal each day   Vitamin D2 50,000 unit oral capsule  take 1 capsule (50,000 unit) by oral route once weekly   Vitamins and Minerals oral tablet  take 1 tablet by oral route daily         Allergy List  Allergen Name Date Reaction Notes   NO KNOWN DRUG ALLERGIES --  --  --          Family Medical History  Disease Name Relative/Age Notes   Family history of Arthritis Brother/  Daughter/  Father/  Mother/  Sister/   Mother; Father; Sister; Brother; Daughter   Family history of stroke Sister/   Sister         Social History  Finding Status Start/Stop Quantity Notes   Alcohol Never --/-- --  does not drink  07/25/2017 -    lives with children --  --/-- --  --    Retired --  --/-- --  --    Tobacco Former --/-- 1.5 PPD stopped 3  months ago    --  --/-- --  --          Review of Systems  · Constitutional  o Denies  o : fever, fatigue, weight loss, weight gain  · Cardiovascular  o Denies  o : lower extremity edema, claudication, chest pressure, palpitations  · Respiratory  o Denies  o : shortness of breath, wheezing, frequent cough, hemoptysis, dyspnea on exertion  · Gastrointestinal  o Denies  o : nausea, vomiting, diarrhea, constipation, abdominal pain      Physical Examination     Gen: well-nourished, no acute distress  HENT: atraumatic, normocephalic  Eyes: extraocular movements intact, no scleral icterus  Lung: breathing comfortably, no cough  Skin: no visible rash, no lesions  Neuro: grossly oriented to person, place, and time. no facial droop   Psych: normal mood and affect             Assessment  · Hyperlipidemia     272.4/E78.5  may hold off on labs for 3-4 wks until covid-19 risk is lower  · Polyneuropathy     356.9/G62.9  · Forgetfulness     780.99/R68.89  · Diabetes mellitus, type 2     250.00/E11.9  labs in 3-4 wks, cont to monitor bs      Plan  · Orders  o Diabetes 2 Panel (Urine Microalbumin, CMP, Lipid, A1c, ) [facility] (28902, 17884, 00787, 62525) - 250.00/E11.9 - 05/05/2020  o CBC with Auto Diff [facility] (70207) - 250.00/E11.9 - 05/05/2020  o Thyroid Profile (14961, 23174, THYII) - 250.00/E11.9 - 05/05/2020  o ACO-39: Current medications updated and reviewed () - - 04/21/2020  · Medications  o FreeStyle Test miscellaneous strip   SIG: Freestyle [place] lite strips. Use Dx:E11.9 QID PRN   DISP: (1) 100 ct box with 3 refills  Refilled on 04/21/2020     o Medications have been Reconciled  o Transition of Care or Provider Policy  · Instructions  o Advised that cheeses and other sources of dairy fats, animal fats, fast food, and the extras (candy, pastries, pies, doughnuts and cookies) all contain LDL raising nutrients. Advised to increase fruits, vegetables, whole grains, and to monitor portion sizes.   o Patient was  educated/instructed on their diagnosis, treatment and medications prior to discharge from the clinic today.  · Disposition  o Call or Return if symptoms worsen or persist.  o Follow up in 6 months  o Labs to be printed  o Prescriptions sent electronically            Electronically Signed by: ULISSES Kumar -Author on April 21, 2020 05:05:37 PM

## 2021-05-13 NOTE — PROGRESS NOTES
"   Progress Note      Patient Name: Sage Michael   Patient ID: [id number]   Sex: Male   YOB: 1932    Primary Care Provider: Laura GTZ   Referring Provider: Luis Santiago DPM    Visit Date: December 14, 2020    Provider: Luis Santiago DPM   Location: [facility]   Location Address: [address] [place] [address]  [id number]   Location Phone: [phone]          Chief Complaint  · Routine Foot Care Visit      History Of Present Illness  Sage Michael complains of painful, elongated toenails which are thickened, yellowed, chalky, and cause pain with shoe gear and ambulation.      New, Established, New Problem:  Established   Location:  Toenails  Duration:   Greater than five years  Onset:  Gradual  Nature:  Sore with palpation.  Stable, worsening, improving:   stable  Aggravating factors:  Pain with shoe gear and ambulation.  Previous Treatment:  Debridement    Patient denies any fevers, chills, nausea, vomiting, shortness of breath or any other constitutional signs nor symptoms.      The patient is here today with his daughter.    Patient reports that no changes in their medications with their recent appointment with their primary care provider.    Pt states their most recent blood glucose reading was \"in the 120s\".       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; Deafness; Degenerative Disc Disease ; Dementia; Diabetes mellitus type 2, noninsulin dependent; Diabetic neuropathy; Diverticula of colon; Foot pain, left; Foot pain, right; Forgetfulness; GERD; Hemorrhoid; Hyperlipidemia; Hypertension; Ingrowing nail; Ingrowing toenail; Neurologic disorder, unspecified; Neuropathy; Parkinson disease; Polyneuropathy; Tinea unguium; Vascular disease, peripheral         Past Surgical History  Aortofemoral vein graft bypass; Carotid artery stenting; Cataract surgery " "        Medication List  Alcohol Pads topical pads, medicated; aspirin 81 mg oral tablet,chewable; Calcium 500 + D 500 mg(1,250mg) -200 unit oral tablet; FreeStyle Test miscellaneous strip; Glucosamine-Chondroitin Complx 500-400 mg oral capsule; Januvia 50 mg oral tablet; Lancets,Ultra Thin miscellaneous misc; lisinopril 2.5 mg oral tablet; Precision Xtra Monitor miscellaneous kit; primidone 50 mg oral tablet; simvastatin 40 mg oral tablet; Synthroid 25 mcg oral tablet; tamsulosin 0.4 mg oral capsule; Vitamins and Minerals oral tablet         Allergy List  NO KNOWN DRUG ALLERGIES       Allergies Reconciled  Family Medical History  Family history of Arthritis; Family history of stroke         Social History  Alcohol (Never); lives with children; Retired; Tobacco (Former);          Review of Systems  · Constitutional  o Denies  o : fever, chills  · Eyes  o Denies  o : double vision  · HENT  o Denies  o : vertigo, recent head injury, hearing loss or ringing  · Cardiovascular  o Denies  o : chest pain, irregular heart beats  · Respiratory  o Denies  o : shortness of breath, productive cough  · Gastrointestinal  o Denies  o : nausea, vomiting  · Integument  o * See HPI  · Neurologic  o Admits  o : tingling or numbness  o Denies  o : altered mental status, seizures  · Musculoskeletal  o Denies  o : joint pain, joint swelling, limitation of motion      Vitals  Date Time BP Position Site L\R Cuff Size HR RR TEMP (F) WT  HT  BMI kg/m2 BSA m2 O2 Sat FR L/min FiO2 HC       12/14/2020 01:16 /99 Sitting    83 - R  97.1 139lbs 0oz 5'  5\" 23.13 1.7 97 %      12/14/2020 01:16 /112 Sitting                 12/14/2020 01:24 /64 Sitting                       Physical Examination  · Constitutional  o Appearance  o : well-nourished, well developed, patient has dementia, appears to be chronically ill   · Eyes  o Vision  o : Patient wearing glasses.   · Respiratory  o Respiratory Effort  o : No labored breathing. " Good respiratory effort.   · Cardiovascular  o Peripheral Vascular System  o :   § Pedal Pulses  § : Pedal Pulses are 2+ and symmetrical  § Extremities  § : There is no edema of the lower extremities  · Musculoskeletal  o Extremeties/Joint  o : Lower extremity muscle strength and range of motion is equal and symmetrical bilaterally. The knees are noted to be in normal alignment. Ankle alignment and range of motion is normal and foot structure is normal.  · Skin and Subcutaneous Tissue  o General Inspection  o : no lesions present, no areas of discoloration, skin turgor normal  · Neurologic  o Sensation  o : Sharp/dull sensation is absent bilaterally. Monofilament sensation examination of the left foot is absent. Monofilament sensation examination of the right foot is absent.   · Toes  o Toes: Right Foot  o :   § Toenails  § : Toenails are hypertrophic, mycotc, dystrophic, thickened, with sublingual debris, incurvated, brittle toenail(s) at nail-1, 3, 5, Oncholysis of the foot   o Toes: Left Foot  o :   § Toenails  § : Toenails are hypertrophic, mycotc, dystrophic, thickened, with sublingual debris, incurvated, brittle toenail(s) at nail-1, 3, 5, Onycholysis of the foot   · Procedures  o Nail Debridement  o : Nail debridement is indicated for the following toenails:-left hallux, left 3rd toe, left 5th toe, right hallux, right 3rd toe, right 5th toe, The nail was debrided of excessive thickness to appropriate levels of comfort and contour using nail nippers. 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       Type 1 diabetes mellitus with diabetic polyneuropathy     250.61/E10.42      Plan  · Orders  o Debridement of six or more nails (70167, 89215, 64793, 09535,  82223, 77566, 74555, 63834, 64912) - 729.5/M79.671, 729.5/M79.672, 703.0/L60.0, 110.1/B35.1 - 12/14/2020  o ACO-17: Screened for tobacco use AND received tobacco cessation intervention (4004F, 4004F, 4004F, 4004F, 4004F, 4004F, 4004F, 4004F, 4004F) - 250.61/E10.42 - 12/14/2020  o Diabetic Foot (Motor and Sensory) Exam Completed [facility] (, , 2028F) - 250.61/E10.42 - 12/14/2020  · Medications  o Medications have been Reconciled  o Transition of Care or Provider Policy  · Instructions  o I have discussed the findings of this evaluation with the patient. The discussion included a complete verbal explanation of any changes in the examination results, diagnosis, and the current treatment plan. A schedule for future care needs was explained. If any questions should arise after returning home, I have encouraged the patient to feel free to contact Dr. Santiago. The patient states understanding and agreement with this plan.   o Patient is to monitor for problems and to contact Dr. Santiago for follow-up should such signs occur. Patient states understanding and agreement with this plan.   o Follow up in 9 weeks for Routine Foot Care.   o Encouraged to follow-up with Primary Care Provider for preventative care.   o Electronically Identified Patient Education Materials Provided Electronically  · Disposition  o Call or Return if symptoms worsen or persist.            Electronically Signed by: Luis Santiago DPM -Author on December 14, 2020 01:40:31 PM

## 2021-05-13 NOTE — PROGRESS NOTES
Progress Note      Patient Name: Sage Michael   Patient ID: [id number]   Sex: Male   YOB: 1932    Primary Care Provider: Laura GTZ   Referring Provider: Luis Santiago DPM    Visit Date: September 21, 2020    Provider: Luis Santiago DPM   Location: [facility]   Location Address: [address] [place] [address]  [id number]   Location Phone: [phone]          Chief Complaint  · Routine Foot Care Visit      History Of Present Illness  Sage Michael complains of painful, elongated toenails which are thickened, yellowed, chalky, and cause pain with shoe gear and ambulation.      New, Established, New Problem:  Established   Location:  Toenails  Duration:   Greater than five years  Onset:  Gradual  Nature:  Sore with palpation.  Stable, worsening, improving:   stable  Aggravating factors:  Pain with shoe gear and ambulation.  Previous Treatment:  Debridement    Patient denies any fevers, chills, nausea, vomiting, shortness of breath or any other constitutional signs nor symptoms.      The patient is here today with his daughter.    Patient reports that no changes in their medications with their recent appointment with their primary care provider.    Pt states their most recent blood glucose reading was 119.       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; Deafness; Degenerative Disc Disease ; Dementia; Diabetes mellitus type 2, noninsulin dependent; Diabetic neuropathy; Diverticula of colon; Foot pain, left; Foot pain, right; Forgetfulness; GERD; Hemorrhoid; Hyperlipidemia; Hypertension; Ingrowing nail; Ingrowing toenail; Neurologic disorder, unspecified; Neuropathy; Parkinson disease; Polyneuropathy; Tinea unguium; Vascular disease, peripheral         Past Surgical History  Carotid artery stenting; Cataract surgery         Medication List  aspirin 81 mg oral  "tablet,chewable; Calcium 500 + D 500 mg(1,250mg) -200 unit oral tablet; FreeStyle Test miscellaneous strip; Glucosamine-Chondroitin Complx 500-400 mg oral capsule; Januvia 50 mg oral tablet; lisinopril 2.5 mg oral tablet; Precision Xtra Monitor miscellaneous kit; primidone 50 mg oral tablet; simvastatin 40 mg oral tablet; Synthroid 25 mcg oral tablet; tamsulosin 0.4 mg oral capsule; Vitamin D2 50,000 unit oral capsule; Vitamins and Minerals oral tablet         Allergy List  NO KNOWN DRUG ALLERGIES       Allergies Reconciled  Family Medical History  Family history of Arthritis; Family history of stroke         Social History  Alcohol (Never); lives with children; Retired; Tobacco (Former);          Review of Systems  · Constitutional  o Denies  o : fever, chills  · Eyes  o Denies  o : double vision  · HENT  o Denies  o : vertigo, recent head injury, hearing loss or ringing  · Cardiovascular  o Denies  o : chest pain, irregular heart beats  · Respiratory  o Denies  o : shortness of breath, productive cough  · Gastrointestinal  o Denies  o : nausea, vomiting  · Integument  o * See HPI  · Neurologic  o Admits  o : tingling or numbness  o Denies  o : altered mental status, seizures  · Musculoskeletal  o Denies  o : joint pain, joint swelling, limitation of motion      Vitals  Date Time BP Position Site L\R Cuff Size HR RR TEMP (F) WT  HT  BMI kg/m2 BSA m2 O2 Sat        09/21/2020 01:06 /60 Sitting    83 - R  97.5 135lbs 2oz 5'  5\" 22.49 1.68 94 %          Physical Examination  · Constitutional  o Appearance  o : well-nourished, well developed, patient has dementia, appears to be chronically ill   · Eyes  o Vision  o : Patient wearing glasses.   · Respiratory  o Respiratory Effort  o : No labored breathing. Good respiratory effort.   · Cardiovascular  o Peripheral Vascular System  o :   § Pedal Pulses  § : Pedal Pulses are 2+ and symmetrical  § Extremities  § : There is no edema of the lower " extremities  · Musculoskeletal  o Extremeties/Joint  o : Lower extremity muscle strength and range of motion is equal and symmetrical bilaterally. The knees are noted to be in normal alignment. Ankle alignment and range of motion is normal and foot structure is normal.  · Skin and Subcutaneous Tissue  o General Inspection  o : no lesions present, no areas of discoloration, skin turgor normal  · Neurologic  o Sensation  o : Sharp/dull sensation is absent bilaterally. Monofilament sensation examination of the left foot is absent. Monofilament sensation examination of the right foot is absent.   · Toes  o Toes: Right Foot  o :   § Toenails  § : Toenails are hypertrophic, mycotc, dystrophic, thickened, with sublingual debris, incurvated, brittle toenail(s) at nail-1, 3, 5, Oncholysis of the foot   o Toes: Left Foot  o :   § Toenails  § : Toenails are hypertrophic, mycotc, dystrophic, thickened, with sublingual debris, incurvated, brittle toenail(s) at nail-1, 3, 5, Onycholysis of the foot   · Procedures  o Nail Debridement  o : Nail debridement is indicated for the following toenails:-left hallux, left 3rd toe, left 5th toe, right hallux, right 3rd toe, right 5th toe, The nail was debrided of excessive thickness to appropriate levels of comfort and contour using nail nippers. 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       Type 1 diabetes mellitus with diabetic polyneuropathy     250.61/E10.42      Plan  · Orders  o Debridement of six or more nails (82109, 09114, 65814, 03632, 25893, 92756, 59591, 66919) - 729.5/M79.671, 729.5/M79.672, 703.0/L60.0, 110.1/B35.1 - 09/21/2020  o ACO-17: Screened for tobacco use AND received tobacco cessation intervention (4004F,  4004F, 4004F, 4004F, 4004F, 4004F, 4004F, 4004F) - 250.61/E10.42 - 09/21/2020  o Diabetic Foot (Motor and Sensory) Exam Completed [facility] (, , 2028F) - 250.61/E10.42 - 09/21/2020  · Medications  o Medications have been Reconciled  o Transition of Care or Provider Policy  · Instructions  o I have discussed the findings of this evaluation with the patient. The discussion included a complete verbal explanation of any changes in the examination results, diagnosis, and the current treatment plan. A schedule for future care needs was explained. If any questions should arise after returning home, I have encouraged the patient to feel free to contact Dr. Santiago. The patient states understanding and agreement with this plan.   o Patient is to monitor for problems and to contact Dr. Santiago for follow-up should such signs occur. Patient states understanding and agreement with this plan.   o Follow up in 9 weeks for Routine Foot Care.   o Encouraged to follow-up with Primary Care Provider for preventative care.   o Electronically Identified Patient Education Materials Provided Electronically  · Disposition  o Call or Return if symptoms worsen or persist.            Electronically Signed by: Luis Santiago DPM -Author on September 21, 2020 01:21:32 PM

## 2021-05-13 NOTE — PROGRESS NOTES
Progress Note      Patient Name: Sage Michael   Patient ID: [id number]   Sex: Male   YOB: 1932    Primary Care Provider: Laura GZT   Referring Provider: Luis Santiago DPM    Visit Date: July 23, 2020    Provider: Sarah Clark PA-C   Location: [facility]   Location Address: [address] [place] [address], [address]  [place], [place]  [id number]   Location Phone: [phone]          Chief Complaint  · urinary incontinence       History Of Present Illness  Sage Michael is a 88 year old /White male who presents for evaluation and treatment of:      daughter has noticed urinary incontinence the last week or so.  When he was sitting down and then going to stand up, he left a wet spot. She noticed it happen 4 times the past wk.   She states on the way out of the door today he had a wet spot on his pants.   He is not noticing that he is urinating on himself. Daughter states previously he had full bladder control.   Pt has not complained of pain with urinating but he does not usually complain.   Denies fever, c/o abd pain.   Appetite has been normal.     BG has been well controlled       Past Medical History  Disease Name Date Onset Notes   Arthritis --  --    Broken Bones --  --    Cataract --  --    Colon abnormality --  --    Controlled type 1 diabetes mellitus with diabetic polyneuropathy 07/25/2017 --    Deafness --  --    Degenerative Disc Disease  --  --    Dementia --  --    Diabetes mellitus type 2, noninsulin dependent --  --    Diabetic neuropathy 05/12/2016 --    Diverticula of colon --  --    Foot pain, left 01/23/2018 --    Foot pain, right 01/23/2018 --    Forgetfulness --  --    GERD --  --    Hemorrhoid --  --    Hyperlipidemia 05/12/2016 --    Hypertension --  --    Ingrowing nail 06/27/2018 --    Ingrowing toenail 09/24/2018 --    Neurologic disorder, unspecified --  --    Neuropathy --  --    Parkinson disease 05/12/2016 --    Polyneuropathy 07/25/2017 --     Tinea roshnikristen 07/25/2017 --    Vascular disease, peripheral --  --          Past Surgical History  Procedure Name Date Notes   Carotid artery stenting --  --    Cataract surgery --  --          Medication List  Name Date Started Instructions   aspirin 81 mg oral tablet,chewable  chew 1 tablet (81 mg) by oral route once daily   Calcium 500 + D 500 mg(1,250mg) -200 unit oral tablet  take 1 tablet by oral route daily   FreeStyle Test miscellaneous strip 04/21/2020 Freestyle [place] lite strips. Use Dx:E11.9 QID PRN   Glucosamine-Chondroitin Complx 500-400 mg oral capsule  take 3 capsules by oral route 3 times a day   Januvia 50 mg oral tablet  take 1 tablet (50 mg) by oral route once daily   lisinopril 2.5 mg oral tablet  take 1 tablet (2.5 mg) by oral route once daily   Precision Xtra Monitor miscellaneous kit 03/04/2020 test once a day DX Diabetes   primidone 50 mg oral tablet  take 2 tablets (100 mg) by oral route 3 times per day   simvastatin 40 mg oral tablet  take 1 tablet (40 mg) by oral route once daily in the evening   Synthroid 25 mcg oral tablet 03/20/2020 take 1 tablet (25 mcg) by oral route once daily   tamsulosin 0.4 mg oral capsule  take 2 capsules (0.8 mg) by oral route once daily 1/2 hour following the same meal each day   Vitamin D2 50,000 unit oral capsule  take 1 capsule (50,000 unit) by oral route once weekly   Vitamins and Minerals oral tablet  take 1 tablet by oral route daily         Allergy List  Allergen Name Date Reaction Notes   NO KNOWN DRUG ALLERGIES --  --  --        Allergies Reconciled  Family Medical History  Disease Name Relative/Age Notes   Family history of Arthritis Brother/  Daughter/  Father/  Mother/  Sister/   Mother; Father; Sister; Brother; Daughter   Family history of stroke Sister/   Sister         Social History  Finding Status Start/Stop Quantity Notes   Alcohol Never --/-- --  07/08/2020 - does not drink  07/25/2017 -    lives with children --  --/-- --  --   "  Retired --  --/-- --  --    Tobacco Former --/-- 1.5 PPD 07/08/2020 - stopped 3 months ago    --  --/-- --  --          Review of Systems  · Constitutional  o Denies  o : fatigue, fever, weight gain, weight loss, chills  · Eyes  o Denies  o : changes in vision  · HENT  o Denies  o : ear pain, sore throat  · Cardiovascular  o Denies  o : chest Pain, palpitations, edema (swelling)  · Respiratory  o Denies  o : frequent cough, shortness of breath  · Gastrointestinal  o Denies  o : nausea, vomiting, changes in bowel habits  · Genitourinary  o Admits  o : incontinence  o Denies  o : dysuria, hematuria, urinary frequency, urinary urgency, polyuria  · Integument  o Denies  o : rash  · Neurologic  o Denies  o : headache, tingling or numbness, dizziness  · Musculoskeletal  o Denies  o : joint pain, myalgias  · Endocrine  o Denies  o : polydipsia, polyphagia  · Psychiatric  o Denies  o : mood changes, memory changes, SI/HI  · Heme-Lymph  o Denies  o : easy bruising, easy bleeding, lymph node enlargement or tenderness  · Allergic-Immunologic  o Denies  o : eczema, seasonal allergies, urticaria      Vitals  Date Time BP Position Site L\R Cuff Size HR RR TEMP (F) WT  HT  BMI kg/m2 BSA m2 O2 Sat HC       10/14/2019 01:58 /60 Sitting    63 - R 14 98.2 120lbs 16oz 5'  5\" 20.14 1.59 94 %    11/11/2019 01:10 /49 Sitting    71 - R   123lbs 0oz 5'  5\" 20.47 1.6 92 %    02/03/2020 12:53 /49 Sitting    69 - R   131lbs 0oz 5'  5\" 21.8 1.65 100 %    07/23/2020 10:05 /74 Sitting    80 - R 16 97.7 134lbs 0oz 5'  5\" 22.3 1.67 94 %          Physical Examination  · Constitutional  o Appearance  o : no acute distress, well-nourished  · Head and Face  o Head  o :   § Inspection  § : atraumatic, normocephalic  · Eyes  o Eyes  o : extraocular movements intact, no scleral icterus, no conjunctival injection  · Ears, Nose, Mouth and Throat  o Ears  o :   § External Ears  § : normal  o Nose  o :   § Intranasal Exam  § : " nares patent  o Oral Cavity  o :   § Oral Mucosa  § : moist mucous membranes  · Respiratory  o Respiratory Effort  o : breathing comfortably, symmetric chest rise  o Auscultation of Lungs  o : clear to asculatation bilaterally, no wheezes, rales, or rhonchii  · Cardiovascular  o Heart  o :   § Auscultation of Heart  § : regular rate and rhythm, no murmurs, rubs, or gallops  o Peripheral Vascular System  o :   § Extremities  § : no edema  · Gastrointestinal  o Abdominal Examination  o :   § Abdomen  § : bowel sounds present, non-distended, non-tender  · Skin and Subcutaneous Tissue  o General Inspection  o : no lesions present, no areas of discoloration, skin turgor normal  · Neurologic  o Mental Status Examination  o :   § Orientation  § : grossly oriented to person, place and time  o Gait and Station  o :   § Gait Screening  § : normal gait  · Psychiatric  o General  o : normal mood and affect          Results  · In-Office Procedures  o Lab procedure  § IOP - Urinalysis without Microscopy (Clinitek) [facility] ([id number])   § Color Ur: Yellow   § Clarity Ur: Clear   § Glucose Ur Ql Strip: Negative   § Bilirub Ur Ql Strip: Negative   § Ketones Ur Ql Strip: Negative   § Sp Gr Ur Qn: greater than 1.030   § Hgb Ur Ql Strip: Trace-Lysed   § pH Ur-LsCnc: 6.0   § Prot Ur Ql Strip: 30 mg/dL   § Urobilinogen Ur Strip-mCnc: 0.2 E.U./dL   § Nitrite Ur Ql Strip: Negative   § WBC Est Ur Ql Strip: Negative       Assessment  · Screening for prostate cancer     V76.44/Z12.5  · Hyperlipidemia     272.4/E78.5  labs today  · Hypothyroidism     244.9/E03.9  labs today  · Urinary incontinence     788.30/R32  discussed ddx incontinence, will monitor for worsening sx, fever, vomiting and let us know if these occur. Will send urine for culture and tx based on results. 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 ([id number]) - 788.30/R32, 250.61/E10.42 - 07/23/2020  o Male Physical Primary Care Panel (CMP, CBC, TSH, Lipid, PSA) [facility] (36947, 10835, 17476, 94407, 73054, ) - 250.61/E10.42, 401.9/I10, 244.9/E03.9, 272.4/E78.5 - 07/23/2020  o Urine culture (68520, 76582) - 788.30/R32, 599.70/R31.9 - 07/23/2020  o ACO-39: Current medications updated and reviewed () - - 07/23/2020  · Medications  o Medications have been Reconciled  o Transition of Care or Provider Policy  · Instructions  o Advised that cheeses and other sources of dairy fats, animal fats, fast food, and the extras (candy, pastries, pies, doughnuts and cookies) all contain LDL raising nutrients. Advised to increase fruits, vegetables, whole grains, and to monitor portion sizes.   o Patient was educated/instructed on their diagnosis, treatment and medications prior to discharge from the clinic today.  · Disposition  o Call or Return if symptoms worsen or persist.  o Keep follow up appt as scheduled  o Labs drawn in office today            Electronically Signed by: Sarah Clark PA-C -Author on July 23, 2020 01:47:38 PM

## 2021-05-13 NOTE — PROGRESS NOTES
Progress Note      Patient Name: Sage Michael   Patient ID: [id number]   Sex: Male   YOB: 1932    Primary Care Provider: Laura GTZ   Referring Provider: Luis Santiago DPM    Visit Date: November 9, 2020    Provider: ULISSES Kumar   Location: [facility]   Location Address: [address] [place] [address]  [id number]   Location Phone: [phone]          History Of Present Illness  TELEHEALTH TELEPHONE VISIT  Sage Michael is a 88 year old /White male who is presenting for evaluation via telehealth telephone visit. Verbal consent obtained before beginning visit.   Provider spent 8 minutes with the patient during the telehealth visit.   The following staff were present during this visit: Laura Kemp NP.   Past Medical History/ Overview of Patient Symptoms  Sage Michael is a 88 year old /White male who presents for evaluation and treatment of:      Daughter and pt (hard of hearing) on call.  She reports they recently had a visit on post and labs. He got a good report. 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 07/25/2017 --    Deafness --  --    Degenerative Disc Disease  --  --    Dementia --  --    Diabetes mellitus type 2, noninsulin dependent --  --    Diabetic neuropathy 05/12/2016 --    Diverticula of colon --  --    Foot pain, left 01/23/2018 --    Foot pain, right 01/23/2018 --    Forgetfulness --  --    GERD --  --    Hemorrhoid --  --    Hyperlipidemia 05/12/2016 --    Hypertension --  --    Ingrowing nail 06/27/2018 --    Ingrowing toenail 09/24/2018 --    Neurologic disorder, unspecified --  --     Neuropathy --  --    Parkinson disease 05/12/2016 --    Polyneuropathy 07/25/2017 --    Tinea unguium 07/25/2017 --    Vascular disease, peripheral --  --          Past Surgical History  Procedure Name Date Notes   Aortofemoral vein graft bypass --  --    Carotid artery stenting --  --    Cataract surgery --  --          Medication List  Name Date Started Instructions   Alcohol Pads topical pads, medicated 10/21/2020 apply to affected area by topical route 4 times a day as needed   aspirin 81 mg oral tablet,chewable  chew 1 tablet (81 mg) by oral route once daily   Calcium 500 + D 500 mg(1,250mg) -200 unit oral tablet 10/21/2020 take 1 tablet by oral route daily for 90 days   FreeStyle Test miscellaneous strip 10/21/2020 Freestyle Freedom lite strips. Use Dx:E11.9 QID PRN   Glucosamine-Chondroitin Complx 500-400 mg oral capsule  take 3 capsules by oral route 3 times a day   Januvia 50 mg oral tablet 10/21/2020 take 1 tablet (50 mg) by oral route once daily for 90 days   Lancets,Ultra Thin miscellaneous misc 10/21/2020 use as directed   lisinopril 2.5 mg oral tablet 10/21/2020 take 1 tablet (2.5 mg) by oral route once daily for 90 days   Precision Xtra Monitor miscellaneous kit 03/04/2020 test once a day DX Diabetes   primidone 50 mg oral tablet 10/21/2020 take 2 tablets (100 mg) by oral route 3 times per day   simvastatin 40 mg oral tablet 10/21/2020 take 1 tablet (40 mg) by oral route once daily in the evening for 90 days   Synthroid 25 mcg oral tablet 10/21/2020 take 1 tablet (25 mcg) by oral route once daily   tamsulosin 0.4 mg oral capsule 10/21/2020 take 2 capsules (0.8 mg) by oral route once daily 1/2 hour following the same meal each day for 90 days   Vitamins and Minerals oral tablet  take 1 tablet by oral route daily         Allergy List  Allergen Name Date Reaction Notes   NO KNOWN DRUG ALLERGIES --  --  --        Allergies Reconciled  Family Medical History  Disease Name Relative/Age Notes   Family  history of Arthritis Brother/  Daughter/  Father/  Mother/  Sister/   Mother; Father; Sister; Brother; Daughter   Family history of stroke Sister/   Sister         Social History  Finding Status Start/Stop Quantity Notes   Alcohol Never --/-- --  07/08/2020 - does not drink  07/25/2017 -    lives with children --  --/-- --  --    Retired --  --/-- --  --    Tobacco Former --/-- 1.5 PPD 07/08/2020 - stopped 2019    --  --/-- --  --          Review of Systems  · Constitutional  o Denies  o : fever, fatigue, weight loss, weight gain  · Cardiovascular  o Denies  o : lower extremity edema, claudication, chest pressure, palpitations  · Respiratory  o Denies  o : shortness of breath, wheezing, cough, hemoptysis, dyspnea on exertion  · Gastrointestinal  o Denies  o : nausea, vomiting, diarrhea, constipation, abdominal pain      Physical Examination                Assessment  · Hyperlipidemia     272.4/E78.5  reported good control. will review one daughter brings by labs  · Diabetes mellitus, type 2     250.00/E11.9  reported good control. will review labs  · Essential hypertension     401.9/I10  reported good control    Problems Reconciled  Plan  · Orders  o Physican Telephone evaluation, 5-10 min (70428) - - 11/09/2020  o ACO-39: Current medications updated and reviewed (1159F, ) - - 11/09/2020  · Medications  o Medications have been Reconciled  o Transition of Care or Provider Policy  · Instructions  o Advised that cheeses and other sources of dairy fats, animal fats, fast food, and the extras (candy, pastries, pies, doughnuts and cookies) all contain LDL raising nutrients. Advised to increase fruits, vegetables, whole grains, and to monitor portion sizes.   o Patient advised to monitor blood pressure (B/P) at home and journal readings. Patient informed that a B/P reading at home of more than 130/80 is considered hypertension. For readings greater [id number]/90 or higher patient is advised to follow up in the  office with readings for management. Patient advised to limit sodium intake.  o Plan Of Care:   o Patient was educated/instructed on their diagnosis, treatment and medications prior to discharge from the clinic today.  · Disposition  o Follow up in 6 months            Electronically Signed by: ULISSES Kumar -Author on November 9, 2020 11:51:44 AM

## 2021-05-13 NOTE — PROGRESS NOTES
Progress Note      Patient Name: Sage Michael   Patient ID: [id number]   Sex: Male   YOB: 1932    Primary Care Provider: Laura GTZ   Referring Provider: Luis Santiago DPM    Visit Date: June 22, 2020    Provider: Luis Santiago DPM   Location: [facility]   Location Address: [address] [place] [address]  [id number]   Location Phone: [phone]          Chief Complaint  · Routine Foot Care Visit      History Of Present Illness  Sage Michael complains of painful, elongated toenails which are thickened, yellowed, chalky, and cause pain with shoe gear and ambulation.      New, Established, New Problem:  Established   Location:  Toenails  Duration:   Greater than five years  Onset:  Gradual  Nature:  Sore with palpation.  Stable, worsening, improving:   Worsening  Aggravating factors:  Pain with shoe gear and ambulation.  Previous Treatment:  Debridement    Patient denies any fevers, chills, nausea, vomiting, shortness of breath or any other constitutional signs nor symptoms.      The patient is here today with his daughter.    Patient reports that no changes in their medications with their recent appointment with their primary care provider.    Pt states their most recent blood glucose reading was 102.       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; Deafness; Degenerative Disc Disease ; Dementia; Diabetes mellitus type 2, noninsulin dependent; Diabetic neuropathy; Diverticula of colon; Foot pain, left; Foot pain, right; Forgetfulness; GERD; Hemorrhoid; Hyperlipidemia; Hypertension; Ingrowing nail; Ingrowing toenail; Neurologic disorder, unspecified; Neuropathy; Parkinson disease; Polyneuropathy; Tinea unguium; Vascular disease, peripheral         Past Surgical History  Carotid artery stenting; Cataract surgery         Medication List  aspirin 81  "mg oral tablet,chewable; Calcium 500 + D 500 mg(1,250mg) -200 unit oral tablet; FreeStyle Test miscellaneous strip; Glucosamine-Chondroitin Complx 500-400 mg oral capsule; Januvia 50 mg oral tablet; lisinopril 2.5 mg oral tablet; Precision Xtra Monitor miscellaneous kit; primidone 50 mg oral tablet; simvastatin 40 mg oral tablet; Synthroid 25 mcg oral tablet; tamsulosin 0.4 mg oral capsule; Vitamin D2 50,000 unit oral capsule; Vitamins and Minerals oral tablet         Allergy List  NO KNOWN DRUG ALLERGIES       Allergies Reconciled  Family Medical History  Family history of Arthritis; Family history of stroke         Social History  Alcohol (Never); lives with children; Retired; Tobacco (Former);          Review of Systems  · Constitutional  o Denies  o : fever, chills  · Eyes  o Denies  o : double vision  · HENT  o Denies  o : vertigo, recent head injury, hearing loss or ringing  · Cardiovascular  o Denies  o : chest pain, irregular heart beats  · Respiratory  o Denies  o : shortness of breath, productive cough  · Gastrointestinal  o Denies  o : nausea, vomiting  · Integument  o * See HPI  · Neurologic  o Admits  o : tingling or numbness  o Denies  o : altered mental status, seizures  · Musculoskeletal  o Denies  o : joint pain, joint swelling, limitation of motion      Vitals  Date Time BP Position Site L\R Cuff Size HR RR TEMP (F) WT  HT  BMI kg/m2 BSA m2 O2 Sat HC       06/22/2020 01:22 PM 60/0 Sitting    83 - R  98 133lbs 0oz 5'  5\" 22.13 1.66 93 %    06/22/2020 01:22 /71 Sitting                     Physical Examination  · Constitutional  o Appearance  o : well-nourished, well developed, appears to be chronically ill   · Eyes  o Vision  o : Patient wearing glasses.   · Respiratory  o Respiratory Effort  o : No labored breathing. Good respiratory effort.   · Cardiovascular  o Peripheral Vascular System  o :   § Pedal Pulses  § : Pedal Pulses are 2+ and symmetrical  § Extremities  § : There is no " edema of the lower extremities  · Musculoskeletal  o Extremeties/Joint  o : Lower extremity muscle strength and range of motion is equal and symmetrical bilaterally. The knees are noted to be in normal alignment. Ankle alignment and range of motion is normal and foot structure is normal.  · Skin and Subcutaneous Tissue  o General Inspection  o : no lesions present, no areas of discoloration, skin turgor normal  · Neurologic  o Sensation  o : Sharp/dull sensation is absent bilaterally. Monofilament sensation examination of the left foot is absent. Monofilament sensation examination of the right foot is absent.   · Toes  o Toes: Right Foot  o :   § Toenails  § : Toenails are hypertrophic, mycotc, dystrophic, thickened, with sublingual debris, incurvated, brittle toenail(s) at nail-1, 3, 5, Oncholysis of the foot   o Toes: Left Foot  o :   § Toenails  § : Toenails are hypertrophic, mycotc, dystrophic, thickened, with sublingual debris, incurvated, brittle toenail(s) at nail-1, 3, 5, Onycholysis of the foot   · Procedures  o Nail Debridement  o : Nail debridement is indicated for the following toenails:-left hallux, left 3rd toe, left 5th toe, right hallux, right 3rd toe, right 5th toe, The nail was debrided of excessive thickness to appropriate levels of comfort and contour using nail nippers. 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 (, , 2028F) - 250.61/E10.42 - 06/22/2020  · Medications  o Medications have been Reconciled  o Transition of Care or Provider Policy  · Instructions  o I have discussed the findings of this evaluation with the patient. The discussion included a complete verbal explanation of any changes in the examination results, diagnosis, and the current treatment plan. A schedule for future care needs was explained. If any questions should arise after returning home, I have encouraged the patient to feel free to contact Dr. Santiago. The patient states understanding and agreement with this plan.   o Patient is to monitor for problems and to contact Dr. Santiago for follow-up should such signs occur. Patient states understanding and agreement with this plan.   o Follow up in 9 weeks for Routine Foot Care.   o Encouraged to follow-up with Primary Care Provider for preventative care.   o Electronically Identified Patient Education Materials Provided Electronically  · Disposition  o Call or Return if symptoms worsen or persist.            Electronically Signed by: Luis Santiago DPM -Author on June 22, 2020 01:32:05 PM

## 2021-05-13 NOTE — PROGRESS NOTES
Progress Note      Patient Name: Sage Michael   Patient ID: [id number]   Sex: Male   YOB: 1932    Primary Care Provider: Laura GTZ   Referring Provider: Luis Santiago DPM    Visit Date: May 4, 2020    Provider: ULISSES Kumar   Location: [facility]   Location Address: [address] [place] [address]  [id number]   Location Phone: [phone]          Chief Complaint  · Annual Wellness Exam      History Of Present Illness  Video Conferencing Visit  The patient is a 87 year old /White male who is presenting for evaluation via video conferencing via zoom for his Annual Wellness Visit. Verbal consent obtained before beginning visit.   The following staff were present during the visit: Nazanin BOOTH LPN and ULISSES Rahman   His Primary Care Provider is Laura GTZ. His comprehensive Care Team list, including suppliers, has been updated on the Facesheet. His medical/family history, height, weight, BMI, and blood pressure have been reviewed and are in the chart. The Health Risk Assessment has been completed and scanned in the chart.   Medications are listed in the medication list.   The active problem list includes: Arthritis, Broken Bones, Cataract, Colon abnormality, Controlled type 1 diabetes mellitus with diabetic polyneuropathy, Deafness, Diabetic neuropathy, Foot pain, left, Foot pain, right, Forgetfulness, GERD, Hemorrhoid, Hyperlipidemia, Hypertension, Ingrowing nail, Ingrowing toenail, Parkinson disease, Polyneuropathy, and Tinea unguium   The patient does not have a history of substance use.   Patient reports his diet is adequate.   The Mini-Cog has been administered and is scanned in chart. The results are positive. His cognitive function is limited, including forgetfulness, with the severity being moderate, unchanged per daughter.   A hearing loss screen was completed today and the result is positive, indicating a need for  further evaluation.   Patient does not have any risk factors for depression. Patient completed the PHQ-9 today and it has been scanned in the chart. The total score is 5-9.   The Timed Up and Go screen was administered today and the result is negative.   The Lilly Index of [place] in ADLs indicated full function (score of 6).   A Falls Risk Assessment has been completed, including a review of home fall hazards and medication review.   Overall, the patient's functional ability is noted by this provider to be within normal limits. His level of safety is noted to be within normal limits. His balance/gait is within normal limits. There have been no falls in the past year. Patient-specific home safety recommendations have been reviewed and a copy has been given to patient.   He denies issues with leaking urine.   There are no additional risk factors identified.   Living Will/Advanced Directive previously executed but not in chart.   Personalized health advice was given to the patient and a written health screening schedule was established; see Plan for details.   Sage Michael is a 87 year old /White male who presents for evaluation and treatment of:      via zoom       Past Medical History  Disease Name Date Onset Notes   Arthritis --  --    Broken Bones --  --    Cataract --  --    Colon abnormality --  --    Controlled type 1 diabetes mellitus with diabetic polyneuropathy 07/25/2017 --    Deafness --  --    Degenerative Disc Disease  --  --    Dementia --  --    Diabetes mellitus type 2, noninsulin dependent --  --    Diabetic neuropathy 05/12/2016 --    Diverticula of colon --  --    Foot pain, left 01/23/2018 --    Foot pain, right 01/23/2018 --    Forgetfulness --  --    GERD --  --    Hemorrhoid --  --    Hyperlipidemia 05/12/2016 --    Hypertension --  --    Ingrowing nail 06/27/2018 --    Ingrowing toenail 09/24/2018 --    Neurologic disorder, unspecified --  --    Neuropathy --  --    Parkinson  disease 05/12/2016 --    Polyneuropathy 07/25/2017 --    Tinea unguium 07/25/2017 --    Vascular disease, peripheral --  --          Past Surgical History  Procedure Name Date Notes   Carotid artery stenting --  --    Cataract surgery --  --          Medication List  Name Date Started Instructions   aspirin 81 mg oral tablet,chewable  chew 1 tablet (81 mg) by oral route once daily   Calcium 500 + D 500 mg(1,250mg) -200 unit oral tablet  take 1 tablet by oral route daily   FreeStyle Test miscellaneous strip 04/21/2020 Freestyle [place] lite strips. Use Dx:E11.9 QID PRN   Glucosamine-Chondroitin Complx 500-400 mg oral capsule  take 3 capsules by oral route 3 times a day   Januvia 50 mg oral tablet  take 1 tablet (50 mg) by oral route once daily   lisinopril 2.5 mg oral tablet  take 1 tablet (2.5 mg) by oral route once daily   Precision Xtra Monitor miscellaneous kit 03/04/2020 test once a day DX Diabetes   primidone 50 mg oral tablet  take 2 tablets (100 mg) by oral route 3 times per day   simvastatin 40 mg oral tablet  take 1 tablet (40 mg) by oral route once daily in the evening   Synthroid 25 mcg oral tablet 03/20/2020 take 1 tablet (25 mcg) by oral route once daily   tamsulosin 0.4 mg oral capsule  take 2 capsules (0.8 mg) by oral route once daily 1/2 hour following the same meal each day   Vitamin D2 50,000 unit oral capsule  take 1 capsule (50,000 unit) by oral route once weekly   Vitamins and Minerals oral tablet  take 1 tablet by oral route daily         Allergy List  Allergen Name Date Reaction Notes   NO KNOWN DRUG ALLERGIES --  --  --        Allergies Reconciled  Family Medical History  Disease Name Relative/Age Notes   Family history of Arthritis Brother/  Daughter/  Father/  Mother/  Sister/   Mother; Father; Sister; Brother; Daughter   Family history of stroke Sister/   Sister         Social History  Finding Status Start/Stop Quantity Notes   Alcohol Never --/-- --  does not drink  07/25/2017 -    lives  with children --  --/-- --  --    Retired --  --/-- --  --    Tobacco Former --/-- 1.5 PPD stopped 3 months ago    --  --/-- --  --          Physical Examination                Assessment  · Encounter for Medicare annual wellness exam     V70.0/Z00.00  · Screening for depression     V79.0/Z13.89  · Alcohol screening     V79.1/Z13.89  · Advanced care planning/counseling discussion     V65.49/Z71.89    Problems Reconciled  Plan  · Orders  o Falls Risk Assessment Completed (3288F) - V70.0/Z00.00 - 05/04/2020  o Brief hearing screening (written) [facility] () - V70.0/Z00.00 - 05/04/2020  o Annual Wellness Visit-includes a Personalized Prevention Plan of Service (PPS), SUBSEQUENT VISIT (Medicare) () - V70.0/Z00.00 - 05/04/2020  o ACO-13: Fall Risk Screening with no falls in past year or only one fall without injury in the past year (1101F) - V70.0/Z00.00 - 05/04/2020  o Presence or absence of urinary incontinence assessed (NALINI) (1090F) - V70.0/Z00.00 - 05/04/2020  o Annual depression screening using the PHQ-9 tool, 15 minutes (, 96579) - V79.0/Z13.89 - 05/04/2020  o Annual alcohol screening using the AUDIT-C tool, 15 minutes [facility] (09645, ) - V79.1/Z13.89 - 05/04/2020  o Negative alcohol screening () - V79.1/Z13.89 - 05/04/2020  o ACO-39: Current medications updated and reviewed () - - 05/04/2020  o ACO-19: Colorectal cancer screening results documented and reviewed (3017F) - - 05/04/2020  o ACO-14: Influenza immunization administered or previously received () - - 05/04/2020  o ACO - Advance Care Plan or Surrogate Decision Maker documented in EMR (1123F) - - 05/04/2020  o Male Physical Primary Care Panel (CMP, CBC, TSH, Lipid, PSA) [facility] (97101, 14027, 97150, , 53559, 86080) - - 05/04/2020  o ACO-18: Negative screen for clinical depression using a standardized tool () - - 05/04/2020  o ACO-13: Fall Risk Screening with no falls in past year or only one fall without injury in the  past year (1101F) - - 05/04/2020  · Medications  o Medications have been Reconciled  o Transition of Care or Provider Policy  · Instructions  o Health Risk Assessment has been reviewed with the patient.  o Written health screening schedule for next 5-10 years was established with patient; information scanned in chart and given/mailed to patient.  o Fall prevention methods discussed and a copy of recommendations given/mailed to patient.  o Depression Screen completed and scanned into the EMR under the designated folder within the patient's documents.  o Today's PHQ-9 result is _8__  o Patient was educated/instructed on their diagnosis, treatment and medications prior to discharge from the clinic today.            Electronically Signed by: ULISSES Kumar -Author on May 4, 2020 03:27:10 PM

## 2021-05-13 NOTE — PROGRESS NOTES
Progress Note      Patient Name: Sage Michael   Patient ID: [id number]   Sex: Male   YOB: 1932    Primary Care Provider: Laura GTZ   Referring Provider: Luis Santiago DPM    Visit Date: July 8, 2020    Provider: Nico Carver MD   Location: [facility]   Location Address: [address] [place] [address]  [id number]   Location Phone: [phone]          Chief Complaint     6 mo f/u tremor, dementia, diabetes. Prior pt of Dr. Miller.       History Of Present Illness  Sage Michael is a 88 year old /White male who presents today to [facility] today referred from Luis Santiago DPM.      88-year-old man here for follow-up of his Alzheimer's disease.  The patient was a former patient of Dr. Mliler.  He has had a significant mental decline since his last clinic visit.  He is confusing daytime to nighttime and nighttime for daytime.  He does not know when he is supposed to eat.  He is unable to carry on conversations that are meaningful according to the daughter.  He is independent with bathing, dressing, toileting and his physical needs but he does not want to use a shower chair nor does he want to use a walker.  He walks very slowly and he is furniture walking holding onto the furniture.  He has a bent posture and he has weakness.  He has difficulty getting in and out of a chair as well as walking around.  She has decided to take control of his medications and she stays with him 24 hours a day.  There is a sister that will help her if she needs help.  The patient does not have any unsafe behavior at this time and has not fallen down.  [facility] and [facility] does not [facility] for that.  She is looking for somebody to help her at least twice a month for 48 hours at a time to relieve herself but that has not materialized at this time especially after the COVID-19 pandemic.    He has tremor at this time and is taking  primidone 50 mg 2 tablets 3 times a day.  She thinks it helps him.       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; Deafness; Degenerative Disc Disease ; Dementia; Diabetes mellitus type 2, noninsulin dependent; Diabetic neuropathy; Diverticula of colon; Foot pain, left; Foot pain, right; Forgetfulness; GERD; Hemorrhoid; Hyperlipidemia; Hypertension; Ingrowing nail; Ingrowing toenail; Neurologic disorder, unspecified; Neuropathy; Parkinson disease; Polyneuropathy; Tinea unguium; Vascular disease, peripheral         Past Surgical History  Carotid artery stenting; Cataract surgery         Medication List  aspirin 81 mg oral tablet,chewable; Calcium 500 + D 500 mg(1,250mg) -200 unit oral tablet; FreeStyle Test miscellaneous strip; Glucosamine-Chondroitin Complx 500-400 mg oral capsule; Januvia 50 mg oral tablet; lisinopril 2.5 mg oral tablet; Precision Xtra Monitor miscellaneous kit; primidone 50 mg oral tablet; simvastatin 40 mg oral tablet; Synthroid 25 mcg oral tablet; tamsulosin 0.4 mg oral capsule; Vitamin D2 50,000 unit oral capsule; Vitamins and Minerals oral tablet         Allergy List  NO KNOWN DRUG ALLERGIES         Family Medical History  Family history of Arthritis; Family history of stroke         Social History  Alcohol (Never); lives with children; Retired; Tobacco (Former);          Review of Systems  · Constitutional  o Denies  o : chills, excessive sweating, fatigue, fever, sycope/passing out, weight gain, weight loss  · Eyes  o Denies  o : changes in vision, blurry vision, double vision  · HENT  o Admits  o : seasonal allergies  o Denies  o : loss of hearing, ringing in the ears, ear aches, sore throat, nasal congestion, sinus pain, nose bleeds  · Cardiovascular  o Admits  o : easy burising or bleeding  o Denies  o : blood clots, swollen legs, anemia, transfusions  · Respiratory  o Admits  o :  "shortness of breath  o Denies  o : dry cough, productive cough, pneumonia, COPD  · Gastrointestinal  o Admits  o : difficulty swallowing  o Denies  o : reflux  · Genitourinary  o Admits  o : incontinence  · Neurologic  o Admits  o : tremor, loss of balance, dizziness/vertigo, difficulty with sleep, numbness/tingling/paresthesia , difficulty with coordination, difficulty with dexterity, weakness  o Denies  o : headache, seizure, stroke, falls  · Musculoskeletal  o Admits  o : joint pain, weakness, low back pain  o Denies  o : neck stiffness/pain, swollen lymph nodes, muscle aches, spasms, sciatica, pain radiating in arm, pain radiating in leg  · Endocrine  o Admits  o : diabetes, thyroid disorder  · Psychiatric  o Denies  o : anxiety, depression      Vitals  Date Time BP Position Site L\R Cuff Size HR RR TEMP (F) WT  HT  BMI kg/m2 BSA m2 O2 Sat HC       07/08/2020 12:17 PM        97.6         07/08/2020 12:54 /49 Sitting    72 - R   134lbs 0oz 5'  5\" 22.3 1.67           Physical Examination     He is alert, has difficulty following commands.  He did not pitch into the conversation.  He is significantly hard of hearing.  There is weakness of his upper and lower extremities 4 or 5 proximally and distally.  On Station gait is able to get up and he has slow movements and decreased armswing with left arm.  There is no tremor.  Turning is intact.  Is slightly unsteady and he does have to hold onto the furniture initially.  Heart is regular in rhythm normal in rate.  There is no significant tremor noted on finger-to-nose testing on the left or the right hand.  Examination was limited to the patient having difficulty with following commands.           Assessment  · Alzheimer's Disease       Alzheimer's disease, unspecified     331.0/G30.9  Dementia in other diseases classified elsewhere without behavioral disturbance     331.0/F02.80  I discussed with him regarding symptomatic treatment for Alzheimer's disease as well " as medications that he is taking at this time. I counseled her regarding caregiving as well as options that she may have such as a memory care unit in the future. She states that she has enough support since her sister is going to retire in the next few months to a year.    40 minutes was spent for this high complexity encounter more than half the time was spent face-to-face with the patient and the daughter for examination, counseling, planning and recommendations.  · Essential tremor       Essential tremor     333.1/G25.0  I discussed with the daughter that she should taper himself off primidone until he is taking 50 mg 3 times a day at the lowest dose that helps a tremor.  · Gait instability     781.2/R26.81  I will set him up for physical therapy for strengthening exercises as well as gait training for a Rollator walker which the patient refuses to use at this time. He is at increased risk for falls.    Problems Reconciled  Plan  · Orders  o PHYSICAL THERAPY CONSULTATION ([facility]) - 331.0/G30.9, 331.0/F02.80, 781.2/R26.81, 333.1/G25.0 - 07/08/2020   home health for gait training with rollator walker and strengthening for 2 weeks.  · Medications  o Medications have been Reconciled  o Transition of Care or Provider Policy  · Instructions  o Encouraged to follow-up with Primary Care Provider for preventative care.  o Follow up in 8 months.             Electronically Signed by: Nico Carver MD -Author on July 8, 2020 01:47:06 PM

## 2021-05-14 VITALS — HEIGHT: 65 IN | WEIGHT: 138 LBS | TEMPERATURE: 97.9 F | BODY MASS INDEX: 22.99 KG/M2

## 2021-05-14 VITALS
OXYGEN SATURATION: 94 % | TEMPERATURE: 97.5 F | SYSTOLIC BLOOD PRESSURE: 147 MMHG | WEIGHT: 135.12 LBS | DIASTOLIC BLOOD PRESSURE: 60 MMHG | BODY MASS INDEX: 22.51 KG/M2 | HEART RATE: 83 BPM | HEIGHT: 65 IN

## 2021-05-14 VITALS
DIASTOLIC BLOOD PRESSURE: 99 MMHG | HEART RATE: 83 BPM | BODY MASS INDEX: 23.16 KG/M2 | OXYGEN SATURATION: 97 % | HEIGHT: 65 IN | WEIGHT: 139 LBS | TEMPERATURE: 97.1 F | SYSTOLIC BLOOD PRESSURE: 166 MMHG

## 2021-05-14 VITALS
HEIGHT: 65 IN | BODY MASS INDEX: 22.49 KG/M2 | WEIGHT: 135 LBS | SYSTOLIC BLOOD PRESSURE: 149 MMHG | DIASTOLIC BLOOD PRESSURE: 53 MMHG

## 2021-05-14 VITALS
WEIGHT: 132 LBS | BODY MASS INDEX: 21.99 KG/M2 | DIASTOLIC BLOOD PRESSURE: 52 MMHG | HEART RATE: 94 BPM | TEMPERATURE: 97.6 F | SYSTOLIC BLOOD PRESSURE: 140 MMHG | OXYGEN SATURATION: 98 % | HEIGHT: 65 IN

## 2021-05-14 VITALS
WEIGHT: 132.56 LBS | DIASTOLIC BLOOD PRESSURE: 63 MMHG | SYSTOLIC BLOOD PRESSURE: 128 MMHG | HEIGHT: 65 IN | BODY MASS INDEX: 22.09 KG/M2 | HEART RATE: 87 BPM

## 2021-05-14 NOTE — PROGRESS NOTES
Progress Note      Patient Name: Sage Michael   Patient ID: [id number]   Sex: Male   YOB: 1932    Primary Care Provider: Laura GTZ   Referring Provider: Jackie Davies MD    Visit Date: March 8, 2021    Provider: Nico Carver MD   Location: [facility]   Location Address: [address] [place] [address]  [id number]   Location Phone: [phone]          Chief Complaint     Pt here for 8 mo f/u       History Of Present Illness  Sage Michael is a 88 year old /White male who presents today to [facility] today referred from Jackie Davies MD.      88-year-old man here for follow-up of his dementia. He is here today with his daughter. He requires assistance for all activities of daily living except eating. His daughter prepares the meals and he will eat it. He requires assistance for bathing, dressing, toileting. He cannot be left alone. He is supervised 24 hours a day. He will wander at night. He does not sleep well. The caregiver is the daughter as well as her boyfriend. The other sister broke her foot. The patient is supposed be using a Rollator walker. He does not use it all the time and he has fallen down. The daughter wants to know if I can document that he needs nursing home placement in the future. I have recommend that last year. She is in charge of taking care of all his medications. She tells me that he will not be able to recognize conversations. He can answer simple questions.    According to the daughter the patient has had sometimes which it is difficult for him to be cared for and he receives sometimes bathing.       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; Deafness; Degenerative Disc Disease ; Dementia; Diabetes mellitus type 2, noninsulin dependent; Diabetic neuropathy; Diverticula of colon; Foot pain, left; Foot  pain, right; Forgetfulness; GERD; Hemorrhoid; Hyperlipidemia; Hypertension; Ingrowing nail; Ingrowing toenail; Lumbago; Neurologic disorder, unspecified; Neuropathy; Parkinson disease; Polyneuropathy; Tinea unguium; Vascular disease, peripheral         Past Surgical History  Aortofemoral vein graft bypass; Carotid artery stenting; Cataract surgery         Medication List  Alcohol Pads topical pads, medicated; aspirin 81 mg oral tablet,chewable; Calcium 500 + D 500 mg(1,250mg) -200 unit oral tablet; FreeStyle Test miscellaneous strip; Glucosamine-Chondroitin Complx 500-400 mg oral capsule; Januvia 50 mg oral tablet; Lancets,Ultra Thin miscellaneous misc; lisinopril 2.5 mg oral tablet; LSO Brace; Precision Xtra Monitor miscellaneous kit; primidone 50 mg oral tablet; simvastatin 40 mg oral tablet; Synthroid 25 mcg oral tablet; tamsulosin 0.4 mg oral capsule; Vitamins and Minerals oral tablet         Allergy List  NO KNOWN DRUG ALLERGIES         Family Medical History  Family history of Arthritis; Family history of stroke         Social History  Alcohol (Never); lives with children; Retired; Tobacco (Former);          Review of Systems  · Constitutional  o Denies  o : chills, excessive sweating, fatigue, fever, sycope/passing out, weight gain, weight loss  · Eyes  o Admits  o : changes in vision  o Denies  o : blurred vision, double vision  · HENT  o Admits  o : hearing loss, seasonal allergies  o Denies  o : ringing in the ears, ear aches, sore throat, nasal congestion, sinus pain, nose bleeds  · Cardiovascular  o Admits  o : easy burising or bleeding  o Denies  o : blood clots, swollen legs, anemia, transfusions  · Respiratory  o Denies  o : shortness of breath, dry cough, productive cough, pneumonia, COPD  · Gastrointestinal  o Denies  o : dysphagia, reflux  · Genitourinary  o Denies  o : incontinence  · Neurologic  o Admits  o : loss of balance, falls, difficulty with sleep  o Denies  o : headache, seizure,  "stroke, tremor, dizziness/vertigo, numbness/tingling/paresthesia , difficulty with coordination, difficulty with dexterity, weakness  · Musculoskeletal  o Admits  o : muscle aches, weakness, low back pain  o Denies  o : neck stiffness/pain, swollen lymph nodes, joint pain, spasms, sciatica, pain radiating in arm, pain radiating in leg  · Endocrine  o Denies  o : diabetes, thyroid disorder  · Psychiatric  o Denies  o : anxiety, depression      Vitals  Date Time BP Position Site L\R Cuff Size HR RR TEMP (F) WT  HT  BMI kg/m2 BSA m2 O2 Sat FR L/min FiO2 HC       03/08/2021 12:42 /63 Sitting    87 - R   132lbs 9oz 5'  5\" 22.06 1.66             Physical Examination     He was listening to me and his daughter the whole time I was speaking to her but he could not understand what we're talking about. When I asked him how he was he said he was doing well. When I asked him who the person who brought him here was he said it was his caregiver. I asked him how he was related to her and he did not know. He was able to tell me her name. When I asked him if that was his daughter he said yes. He was able to ambulate using a Rollator walker safely.           Assessment  · Alzheimer's Disease       Alzheimer's disease, unspecified     331.0/G30.9  Dementia in other diseases classified elsewhere without behavioral disturbance     331.0/F02.80  I'll start him on donepezil 5 mg nightly and then after a week start him on memantine. Explained to the daughter regarding the adverse effects of domicile and memantine. He will continue to take donepezil when memantine is started. He'll take 5 mg daily for 1 week and then twice a day the 2nd week and then 5 mg in the morning and 10 mg in the evening to 3rd week and then 10 mg twice a day thereafter. I explained to the daughter that the medication may help his behavior changes. I will see him again in 2 to 3 months time with a telehealth visit.    Total time spent with patient and " coordinating patient care was 25 minutes.      Plan  · Medications  o donepezil 5 mg oral tablet   SIG: take 1 tablet (5 mg) by oral route once daily in the evening   DISP: (30) Tablet with 6 refills  Prescribed on 2021     o memantine 5 mg oral tablet   SI tablet daily for 1 week then 1 tablet BID 2nd week then 1 tablet in the AM and 2 tablets in the PM 3rd week then start new prescription.   DISP: (42) Tablet with 0 refills  Prescribed on 2021     o memantine 10 mg oral tablet   SIG: take 1 tablet (10 mg) by oral route 2 times per day starting in 1 month.   DISP: (60) Tablet with 7 refills  Prescribed on 2021     o Medications have been Reconciled  o Transition of Care or Provider Policy  · Instructions  o Encouraged to follow-up with Primary Care Provider for preventative care.            Electronically Signed by: Nico Carver MD -Author on 2021 01:26:05 PM

## 2021-05-14 NOTE — PROGRESS NOTES
Progress Note      Patient Name: Sage Michael   Patient ID: [id number]   Sex: Male   YOB: 1932    Primary Care Provider: Laura GTZ   Referring Provider: Jackie Davies MD    Visit Date: January 26, 2021    Provider: Jackie Davies MD   Location: [facility]   Location Address: [address] [place] [address]  [place], [place]  [id number]   Location Phone: [phone]          Chief Complaint  · discuss lab results      History Of Present Illness  Video Conferencing Visit  Sage Michael is a 88 year old /White male who is presenting for evaluation via video conferencing via zoom. Verbal consent obtained before beginning visit.   The following staff were present during this visit: Jackie Davies MD   Sage Michael is a 88 year old /White male who presents for evaluation and treatment of:      Recent lab results.    Patient with extensive past medical history with severe dementia and limited mobility being the most significant for today's visit.  Patient recently was found to have multilevel compression fractures and had laboratory evaluation. Labs will be reviewed with daughter today who is guardian.       Past Medical History  Disease Name Date Onset Notes   Arthritis --  --    Broken Bones --  --    Cataract --  --    Colon abnormality --  --    Controlled type 1 diabetes mellitus with diabetic polyneuropathy 07/25/2017 --    Deafness --  --    Degenerative Disc Disease  --  --    Dementia --  --    Diabetes mellitus type 2, noninsulin dependent --  --    Diabetic neuropathy 05/12/2016 --    Diverticula of colon --  --    Foot pain, left 01/23/2018 --    Foot pain, right 01/23/2018 --    Forgetfulness --  --    GERD --  --    Hemorrhoid --  --    Hyperlipidemia 05/12/2016 --    Hypertension --  --    Ingrowing nail 06/27/2018 --    Ingrowing toenail 09/24/2018 --    Lumbago --  --    Neurologic disorder, unspecified --  --    Neuropathy --  --     Parkinson disease 05/12/2016 --    Polyneuropathy 07/25/2017 --    Tinea unguium 07/25/2017 --    Vascular disease, peripheral --  --          Past Surgical History  Procedure Name Date Notes   Aortofemoral vein graft bypass --  --    Carotid artery stenting --  --    Cataract surgery --  --          Medication List  Name Date Started Instructions   Alcohol Pads topical pads, medicated 10/21/2020 apply to affected area by topical route 4 times a day as needed   aspirin 81 mg oral tablet,chewable  chew 1 tablet (81 mg) by oral route once daily   Calcium 500 + D 500 mg(1,250mg) -200 unit oral tablet 10/21/2020 take 1 tablet by oral route daily for 90 days   FreeStyle Test miscellaneous strip 10/21/2020 Freestyle Freedom lite strips. Use Dx:E11.9 QID PRN   Glucosamine-Chondroitin Complx 500-400 mg oral capsule  take 3 capsules by oral route 3 times a day   Januvia 50 mg oral tablet 10/21/2020 take 1 tablet (50 mg) by oral route once daily for 90 days   Lancets,Ultra Thin miscellaneous misc 10/21/2020 use as directed   lisinopril 2.5 mg oral tablet 10/21/2020 take 1 tablet (2.5 mg) by oral route once daily for 90 days   LSO Brace 12/29/2020 please use as directed   Precision Xtra Monitor miscellaneous kit 03/04/2020 test once a day DX Diabetes   primidone 50 mg oral tablet 01/14/2021 take 2 tablets (100 mg) by oral route 3 times per day   simvastatin 40 mg oral tablet 10/21/2020 take 1 tablet (40 mg) by oral route once daily in the evening for 90 days   Synthroid 25 mcg oral tablet 10/21/2020 take 1 tablet (25 mcg) by oral route once daily   tamsulosin 0.4 mg oral capsule 10/21/2020 take 2 capsules (0.8 mg) by oral route once daily 1/2 hour following the same meal each day for 90 days   Vitamins and Minerals oral tablet  take 1 tablet by oral route daily         Allergy List  Allergen Name Date Reaction Notes   NO KNOWN DRUG ALLERGIES --  --  --        Allergies Reconciled  Family Medical History  Disease Name  Relative/Age Notes   Family history of Arthritis Brother/  Daughter/  Father/  Mother/  Sister/   Mother; Father; Sister; Brother; Daughter   Family history of stroke Sister/   Sister         Social History  Finding Status Start/Stop Quantity Notes   Alcohol Never --/-- --  07/08/2020 - does not drink  07/25/2017 -    lives with children --  --/-- --  --    Retired --  --/-- --  --    Tobacco Former --/-- 1.5 PPD 07/08/2020 - stopped 2019    --  --/-- --  --          Review of Systems  · Constitutional  o Denies  o : fever, fatigue, weight loss      Physical Examination     Limited due to telehealth  Pt actually did not appear on camera during zoom visit.           Assessment  · Hypothyroidism     244.9/E03.9  Chronic and stable with TSH slightly elevated of 5.03 (compared to 5.09 one year ago). Free t4 wnl. Advised daughter to increase dose of Synthroid to 1.5 tablets (37.5mcg). Thyroid profile in 6 weeks. Follow-up in 6 weeks  · Compression fracture of lumbosacral spine     805.4/S32.000A  Subacute per recent MRI findings. Reviewed labs (cbc, cmp, thyroid profile, Testosterone panel, Vit D level). Testosterone panel abnormal. DEXA scan is pending for evaluation of potential osteoporosis as a contributing factor to his compression fractures. At this time given patient's age at 88 and his other comorbidities there is reluctance to initiate testosterone therapy. Would recommend repeat testosterone level, as well as LH, FSH and prolactin if his DEXA scan is abnormal.    Problems Reconciled  Plan  · Orders  o ACO-39: Current medications updated and reviewed (1159F, ) - 805.4/S32.000A, 244.9/E03.9 - 01/26/2021  · Medications  o Transition of Care or Provider Policy  · Instructions  o Patient was educated/instructed on their diagnosis, treatment and medications prior to discharge from the clinic today.  o Call the office with any concerns or questions.  · Disposition  o Follow up 6  weeks.            Electronically Signed by: Jackie Davies MD -Author on January 31, 2021 03:02:03 PM

## 2021-05-14 NOTE — PROGRESS NOTES
"   Progress Note      Patient Name: Sage Michael   Patient ID: [id number]   Sex: Male   YOB: 1932    Primary Care Provider: Laura GTZ   Referring Provider: Laura GTZ    Visit Date: April 1, 2021    Provider: ULISSES Kumar   Location: [facility]   Location Address: [address] [place] [address]  [id number]   Location Phone: [phone]          Chief Complaint  · Follow up  · \" reviewed labs \"      History Of Present Illness  TELEHEALTH TELEPHONE VISIT  Sage Michael is a 88 year old /White male who is presenting for evaluation via telehealth telephone visit. Verbal consent obtained before beginning visit.   Provider spent 16 minutes with patient during telehealth visit.   The following staff were present during this visit: Laura Kemp NP   Past Medical History/Overview of Patient Symptoms     Phone conversation with daughter regarding patient care.   Recent lumbar compression fracture.  Patient doing well and not complaining of pain.  Discussed recent DEXA scan and diagnosis of osteoporosis.    Also reviewed labs including low testosterone level         Past Medical History  Disease Name Date Onset Notes   Arthritis --  --    Broken Bones --  --    Cataract --  --    Colon abnormality --  --    Controlled type 1 diabetes mellitus with diabetic polyneuropathy 07/25/2017 --    Deafness --  --    Degenerative Disc Disease  --  --    Dementia --  --    Diabetes mellitus type 2, noninsulin dependent --  --    Diabetic neuropathy 05/12/2016 --    Diverticula of colon --  --    Foot pain, left 01/23/2018 --    Foot pain, right 01/23/2018 --    Forgetfulness --  --    GERD --  --    Hemorrhoid --  --    Hyperlipidemia 05/12/2016 --    Hypertension --  --    Ingrowing nail 06/27/2018 --    Ingrowing toenail 09/24/2018 --    Lumbago --  --    Neurologic disorder, unspecified --  --    Neuropathy --  --    Parkinson disease 05/12/2016 --  "   Polyneuropathy 07/25/2017 --    Tinea unguium 07/25/2017 --    Vascular disease, peripheral --  --          Past Surgical History  Procedure Name Date Notes   Aortofemoral vein graft bypass --  --    Carotid artery stenting --  --    Cataract surgery --  --          Medication List  Name Date Started Instructions   Alcohol Pads topical pads, medicated 10/21/2020 apply to affected area by topical route 4 times a day as needed   aspirin 81 mg oral tablet,chewable  chew 1 tablet (81 mg) by oral route once daily   Calcium 500 + D 500 mg(1,250mg) -200 unit oral tablet 10/21/2020 take 1 tablet by oral route daily for 90 days   donepezil 5 mg oral tablet 03/08/2021 take 1 tablet (5 mg) by oral route once daily in the evening   FreeStyle Test miscellaneous strip 10/21/2020 Freestyle Freedom lite strips. Use Dx:E11.9 QID PRN   Glucosamine-Chondroitin Complx 500-400 mg oral capsule  take 3 capsules by oral route 3 times a day   Januvia 50 mg oral tablet 10/21/2020 take 1 tablet (50 mg) by oral route once daily for 90 days   Lancets,Ultra Thin miscellaneous misc 10/21/2020 use as directed   lisinopril 2.5 mg oral tablet 10/21/2020 take 1 tablet (2.5 mg) by oral route once daily for 90 days   LSO Brace 12/29/2020 please use as directed   memantine 10 mg oral tablet 03/08/2021 take 1 tablet (10 mg) by oral route 2 times per day starting in 1 month.   memantine 5 mg oral tablet 03/08/2021 1 tablet daily for 1 week then 1 tablet BID 2nd week then 1 tablet in the AM and 2 tablets in the PM 3rd week then start new prescription.   Precision Xtra Monitor miscellaneous kit 03/04/2020 test once a day DX Diabetes   primidone 50 mg oral tablet 03/03/2021 take 2 tablets (100 mg) by oral route 3 times per day   simvastatin 40 mg oral tablet 10/21/2020 take 1 tablet (40 mg) by oral route once daily in the evening for 90 days   Synthroid 25 mcg oral tablet 10/21/2020 take 1 tablet (25 mcg) by oral route once daily   tamsulosin 0.4 mg oral  capsule 10/21/2020 take 2 capsules (0.8 mg) by oral route once daily 1/2 hour following the same meal each day for 90 days   Vitamins and Minerals oral tablet  take 1 tablet by oral route daily         Allergy List  Allergen Name Date Reaction Notes   NO KNOWN DRUG ALLERGIES --  --  --        Allergies Reconciled  Family Medical History  Disease Name Relative/Age Notes   Family history of Arthritis Brother/  Daughter/  Father/  Mother/  Sister/   Mother; Father; Sister; Brother; Daughter   Family history of stroke Sister/   Sister         Social History  Finding Status Start/Stop Quantity Notes   Alcohol Never --/-- --  07/08/2020 - does not drink  07/25/2017 -    lives with children --  --/-- --  --    Retired --  --/-- --  --    Tobacco Former --/-- 1.5 PPD 07/08/2020 - stopped 2019    --  --/-- --  --          Physical Examination                Assessment  · GERD (gastroesophageal reflux disease)     530.81/K21.9  · Osteoporosis     733.00/M81.0  Discussed concerns regarding treatment options for osteoporosis in this patient. Discussed concerns with testosterone replacement given the patient has moderate to severe dementia. Daughter agreed that she did not want to do testosterone therapy due to increased risk for cardiovascular event. Discussed other nonhormonal pharmacological therapy such as bisphosphonates versus Prolia. We will try to get Prolia covered at this time as he has a history of reflux. Discussed potential side effects of both classes of medications. Will order Prolia and see if insurance covers this.  · Hypogonadism in male     257.2/E29.1  · Lumbar compression fracture     805.4/S32.000A    Problems Reconciled  Plan  · Orders  o Physician Telephone Evaluation, 11-20 minutes (10465) - - 04/01/2021  o ACO-39: Current medications updated and reviewed (, 1159F) - - 04/01/2021  o 60.00 - Prolia 60mg Injection [facility] (-9) - - 04/01/2021  · Medications  o Medications have been  Reconciled  o Transition of Care or Provider Policy  · Instructions  o Plan Of Care:   o Call the office with any concerns or questions.  · Disposition  o Call or Return if symptoms worsen or persist.  o Follow up in 6 weeks            Electronically Signed by: LUISSES Kumar -Author on April 1, 2021 09:33:15 AM  Electronically Co-signed by: Deisy Orantes MD -Reviewer on June 2, 2021 07:52:44 AM

## 2021-05-14 NOTE — PROGRESS NOTES
Progress Note      Patient Name: Sage Michael   Patient ID: [id number]   Sex: Male   YOB: 1932    Primary Care Provider: Laura GTZ   Referring Provider: Jackie Davies MD    Visit Date: March 9, 2021    Provider: Luis Santiago DPM   Location: [facility]   Location Address: [address] [place] [address]  [id number]   Location Phone: [phone]          Chief Complaint  · Routine Foot Care Visit      History Of Present Illness  Sage Michael complains of painful, elongated toenails which are thickened, yellowed, chalky, and cause pain with shoe gear and ambulation.      New, Established, New Problem:  Established   Location:  Toenails  Duration:   Greater than five years  Onset:  Gradual  Nature:  Sore with palpation.  Stable, worsening, improving:   stable  Aggravating factors:  Pain with shoe gear and ambulation.  Previous Treatment:  Debridement    Patient denies any fevers, chills, nausea, vomiting, shortness of breath or any other constitutional signs nor symptoms.      The patient is here today with his daughter.    Patient relates no medical changes since their last visit.    Pt states their most recent blood glucose reading was 115.       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; Deafness; Degenerative Disc Disease ; Dementia; Diabetes mellitus type 2, noninsulin dependent; Diabetic neuropathy; Diverticula of colon; Foot pain, left; Foot pain, right; Forgetfulness; GERD; Hemorrhoid; Hyperlipidemia; Hypertension; Ingrowing nail; Ingrowing toenail; Lumbago; Neurologic disorder, unspecified; Neuropathy; Parkinson disease; Polyneuropathy; Tinea unguium; Vascular disease, peripheral         Past Surgical History  Aortofemoral vein graft bypass; Carotid artery stenting; Cataract surgery         Medication List  Alcohol Pads topical pads, medicated; aspirin 81 mg  "oral tablet,chewable; Calcium 500 + D 500 mg(1,250mg) -200 unit oral tablet; donepezil 5 mg oral tablet; FreeStyle Test miscellaneous strip; Glucosamine-Chondroitin Complx 500-400 mg oral capsule; Januvia 50 mg oral tablet; Lancets,Ultra Thin miscellaneous misc; lisinopril 2.5 mg oral tablet; LSO Brace; memantine 10 mg oral tablet; memantine 5 mg oral tablet; Precision Xtra Monitor miscellaneous kit; primidone 50 mg oral tablet; simvastatin 40 mg oral tablet; Synthroid 25 mcg oral tablet; tamsulosin 0.4 mg oral capsule; Vitamins and Minerals oral tablet         Allergy List  NO KNOWN DRUG ALLERGIES       Allergies Reconciled  Family Medical History  Family history of Arthritis; Family history of stroke         Social History  Alcohol (Never); lives with children; Retired; Tobacco (Former);          Review of Systems  · Constitutional  o Denies  o : fever, chills  · Eyes  o Denies  o : double vision  · HENT  o Denies  o : vertigo, recent head injury, hearing loss or ringing  · Cardiovascular  o Denies  o : chest pain, irregular heart beats  · Respiratory  o Denies  o : shortness of breath, productive cough  · Gastrointestinal  o Denies  o : nausea, vomiting  · Integument  o * See HPI  · Neurologic  o Admits  o : tingling or numbness  o Denies  o : altered mental status, seizures  · Musculoskeletal  o Denies  o : joint pain, joint swelling, limitation of motion      Vitals  Date Time BP Position Site L\R Cuff Size HR RR TEMP (F) WT  HT  BMI kg/m2 BSA m2 O2 Sat FR L/min FiO2        03/09/2021 01:23 /52 Sitting    94 - R  97.6 132lbs 0oz 5'  5\" 21.97 1.66 98 %            Physical Examination  · Constitutional  o Appearance  o : thin, well developed, patient has dementia, appears to be chronically ill   · Eyes  o Vision  o : Patient wearing glasses.   · Respiratory  o Respiratory Effort  o : No labored breathing. Good respiratory effort.   · Cardiovascular  o Peripheral Vascular System  o :   § Pedal " Pulses  § : Pedal Pulses are 2+ and symmetrical  § Extremities  § : There is no edema of the lower extremities  · Musculoskeletal  o Extremeties/Joint  o : Lower extremity muscle strength and range of motion is equal and symmetrical bilaterally. The knees are noted to be in normal alignment. Ankle alignment and range of motion is normal and foot structure is normal.  · Skin and Subcutaneous Tissue  o General Inspection  o : no lesions present, no areas of discoloration, skin turgor normal  · Neurologic  o Sensation  o : Sharp/dull sensation is absent bilaterally. Monofilament sensation examination of the left foot is absent. Monofilament sensation examination of the right foot is absent.   · Toes  o Toes: Right Foot  o :   § Toenails  § : Toenails are hypertrophic, mycotc, dystrophic, thickened, with sublingual debris, incurvated, brittle toenail(s) at nail-1, 3, 5, Oncholysis of the foot   o Toes: Left Foot  o :   § Toenails  § : Toenails are hypertrophic, mycotc, dystrophic, thickened, with sublingual debris, incurvated, brittle toenail(s) at nail-1, 3, 5, Onycholysis of the foot   · Procedures  o Nail Debridement  o : Nail debridement is indicated for the following toenails:-left hallux, left 3rd toe, left 5th toe, right hallux, right 3rd toe, right 5th toe, The nail was debrided of excessive thickness to appropriate levels of comfort and contour using nail nippers. 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       Type 1 diabetes mellitus with diabetic polyneuropathy     250.61/E10.42      Plan  · Orders  o Debridement of six or more nails (02793, 03936, 32711, 39975, 20168, 88466, 65626, 55697, 03037, 98788) - 729.5/M79.671, 729.5/M79.672, 703.0/L60.0,  110.1/B35.1 - 03/09/2021  o ACO-17: Screened for tobacco use AND received tobacco cessation intervention (4004F, 4004F, 4004F, 4004F, 4004F, 4004F, 4004F, 4004F, 4004F, 4004F) - 250.61/E10.42 - 03/09/2021  o Diabetic Foot (Motor and Sensory) Exam Completed [facility] (, , 2028F) - 250.61/E10.42 - 03/09/2021  · Medications  o Medications have been Reconciled  o Transition of Care or Provider Policy  · Instructions  o I have discussed the findings of this evaluation with the patient. The discussion included a complete verbal explanation of any changes in the examination results, diagnosis, and the current treatment plan. A schedule for future care needs was explained. If any questions should arise after returning home, I have encouraged the patient to feel free to contact Dr. Santiago. The patient states understanding and agreement with this plan.   o Patient is to monitor for problems and to contact Dr. Santiago for follow-up should such signs occur. Patient states understanding and agreement with this plan.   o Follow up in 9 weeks for Routine Foot Care.   o Encouraged to follow-up with Primary Care Provider for preventative care.   o Electronically Identified Patient Education Materials Provided Electronically  · Disposition  o Call or Return if symptoms worsen or persist.            Electronically Signed by: Luis Santiago DPM -Author on March 9, 2021 01:32:56 PM

## 2021-05-15 VITALS
WEIGHT: 126 LBS | HEIGHT: 65 IN | BODY MASS INDEX: 20.99 KG/M2 | DIASTOLIC BLOOD PRESSURE: 56 MMHG | OXYGEN SATURATION: 94 % | SYSTOLIC BLOOD PRESSURE: 125 MMHG | HEART RATE: 80 BPM

## 2021-05-15 VITALS
DIASTOLIC BLOOD PRESSURE: 71 MMHG | SYSTOLIC BLOOD PRESSURE: 161 MMHG | WEIGHT: 133 LBS | TEMPERATURE: 98 F | OXYGEN SATURATION: 93 % | HEART RATE: 83 BPM | HEIGHT: 65 IN | BODY MASS INDEX: 22.16 KG/M2

## 2021-05-15 VITALS
HEIGHT: 65 IN | RESPIRATION RATE: 14 BRPM | DIASTOLIC BLOOD PRESSURE: 60 MMHG | HEART RATE: 63 BPM | TEMPERATURE: 98.2 F | OXYGEN SATURATION: 94 % | WEIGHT: 121 LBS | BODY MASS INDEX: 20.16 KG/M2 | SYSTOLIC BLOOD PRESSURE: 112 MMHG

## 2021-05-15 VITALS
SYSTOLIC BLOOD PRESSURE: 122 MMHG | HEIGHT: 65 IN | WEIGHT: 131 LBS | DIASTOLIC BLOOD PRESSURE: 49 MMHG | OXYGEN SATURATION: 100 % | BODY MASS INDEX: 21.83 KG/M2 | HEART RATE: 69 BPM

## 2021-05-15 VITALS
WEIGHT: 123 LBS | SYSTOLIC BLOOD PRESSURE: 107 MMHG | BODY MASS INDEX: 20.49 KG/M2 | DIASTOLIC BLOOD PRESSURE: 49 MMHG | HEART RATE: 71 BPM | OXYGEN SATURATION: 92 % | HEIGHT: 65 IN

## 2021-05-15 VITALS — HEIGHT: 65 IN | WEIGHT: 122 LBS | HEART RATE: 84 BPM | BODY MASS INDEX: 20.33 KG/M2 | OXYGEN SATURATION: 98 %

## 2021-05-15 VITALS
BODY MASS INDEX: 20.08 KG/M2 | SYSTOLIC BLOOD PRESSURE: 102 MMHG | HEIGHT: 65 IN | DIASTOLIC BLOOD PRESSURE: 38 MMHG | HEART RATE: 69 BPM | WEIGHT: 120.5 LBS

## 2021-05-15 VITALS
WEIGHT: 134 LBS | SYSTOLIC BLOOD PRESSURE: 130 MMHG | BODY MASS INDEX: 22.33 KG/M2 | RESPIRATION RATE: 16 BRPM | HEART RATE: 80 BPM | TEMPERATURE: 97.7 F | OXYGEN SATURATION: 94 % | HEIGHT: 65 IN | DIASTOLIC BLOOD PRESSURE: 74 MMHG

## 2021-05-15 VITALS
DIASTOLIC BLOOD PRESSURE: 49 MMHG | WEIGHT: 134 LBS | TEMPERATURE: 97.6 F | SYSTOLIC BLOOD PRESSURE: 108 MMHG | HEART RATE: 72 BPM | BODY MASS INDEX: 22.33 KG/M2 | HEIGHT: 65 IN

## 2021-05-16 VITALS
BODY MASS INDEX: 20.67 KG/M2 | HEIGHT: 65 IN | WEIGHT: 124.06 LBS | SYSTOLIC BLOOD PRESSURE: 119 MMHG | DIASTOLIC BLOOD PRESSURE: 56 MMHG | HEART RATE: 70 BPM

## 2021-05-16 VITALS
WEIGHT: 125 LBS | SYSTOLIC BLOOD PRESSURE: 135 MMHG | OXYGEN SATURATION: 96 % | DIASTOLIC BLOOD PRESSURE: 66 MMHG | HEART RATE: 70 BPM | HEIGHT: 65 IN | BODY MASS INDEX: 20.83 KG/M2

## 2021-05-16 VITALS
RESPIRATION RATE: 15 BRPM | WEIGHT: 121.12 LBS | TEMPERATURE: 97.4 F | DIASTOLIC BLOOD PRESSURE: 58 MMHG | BODY MASS INDEX: 20.18 KG/M2 | SYSTOLIC BLOOD PRESSURE: 104 MMHG | OXYGEN SATURATION: 94 % | HEIGHT: 65 IN | HEART RATE: 80 BPM

## 2021-05-16 VITALS — HEIGHT: 65 IN | OXYGEN SATURATION: 93 % | BODY MASS INDEX: 21.49 KG/M2 | HEART RATE: 92 BPM | WEIGHT: 129 LBS

## 2021-05-16 VITALS — WEIGHT: 128 LBS | HEIGHT: 65 IN | BODY MASS INDEX: 21.33 KG/M2 | HEART RATE: 75 BPM | OXYGEN SATURATION: 95 %

## 2021-05-16 VITALS
HEIGHT: 65 IN | DIASTOLIC BLOOD PRESSURE: 72 MMHG | OXYGEN SATURATION: 94 % | HEART RATE: 79 BPM | TEMPERATURE: 97.9 F | BODY MASS INDEX: 20.68 KG/M2 | WEIGHT: 124.12 LBS | SYSTOLIC BLOOD PRESSURE: 116 MMHG | RESPIRATION RATE: 16 BRPM

## 2021-05-16 VITALS
HEART RATE: 74 BPM | OXYGEN SATURATION: 98 % | SYSTOLIC BLOOD PRESSURE: 128 MMHG | WEIGHT: 124 LBS | HEIGHT: 65 IN | BODY MASS INDEX: 20.66 KG/M2 | DIASTOLIC BLOOD PRESSURE: 51 MMHG

## 2021-05-16 VITALS
RESPIRATION RATE: 15 BRPM | DIASTOLIC BLOOD PRESSURE: 72 MMHG | BODY MASS INDEX: 20.41 KG/M2 | SYSTOLIC BLOOD PRESSURE: 140 MMHG | HEIGHT: 65 IN | TEMPERATURE: 97.3 F | HEART RATE: 79 BPM | OXYGEN SATURATION: 95 % | WEIGHT: 122.5 LBS

## 2021-05-16 VITALS
OXYGEN SATURATION: 95 % | SYSTOLIC BLOOD PRESSURE: 120 MMHG | WEIGHT: 123.37 LBS | RESPIRATION RATE: 15 BRPM | HEART RATE: 88 BPM | TEMPERATURE: 97.3 F | DIASTOLIC BLOOD PRESSURE: 64 MMHG | HEIGHT: 65 IN | BODY MASS INDEX: 20.55 KG/M2

## 2021-05-16 VITALS
WEIGHT: 127 LBS | OXYGEN SATURATION: 97 % | HEIGHT: 65 IN | BODY MASS INDEX: 21.16 KG/M2 | HEART RATE: 80 BPM | SYSTOLIC BLOOD PRESSURE: 121 MMHG | DIASTOLIC BLOOD PRESSURE: 57 MMHG

## 2021-05-16 VITALS — WEIGHT: 130 LBS | HEIGHT: 65 IN | OXYGEN SATURATION: 88 % | HEART RATE: 133 BPM | BODY MASS INDEX: 21.66 KG/M2

## 2021-05-20 ENCOUNTER — OFFICE VISIT CONVERTED (OUTPATIENT)
Dept: NEUROSURGERY | Facility: CLINIC | Age: 86
End: 2021-05-20
Attending: NEUROLOGICAL SURGERY

## 2021-06-01 ENCOUNTER — PROCEDURE VISIT CONVERTED (OUTPATIENT)
Dept: PODIATRY | Facility: CLINIC | Age: 86
End: 2021-06-01
Attending: PODIATRIST

## 2021-06-01 ENCOUNTER — CONVERSION ENCOUNTER (OUTPATIENT)
Dept: PODIATRY | Facility: CLINIC | Age: 86
End: 2021-06-01

## 2021-06-05 NOTE — PROGRESS NOTES
Progress Note      Patient Name: Sage Michael   Patient ID: [id number]   Sex: Male   YOB: 1932    Primary Care Provider: Laura GTZ   Referring Provider: Jackie Davies MD    Visit Date: June 1, 2021    Provider: Luis Santiago DPM   Location: [facility]   Location Address: [address] [place] [address]  [id number]   Location Phone: [phone]          Chief Complaint  · Routine Foot Care Visit      History Of Present Illness  Sage Michael complains of painful, elongated toenails which are thickened, yellowed, chalky, and cause pain with shoe gear and ambulation.      New, Established, New Problem:  Established   Location:  Toenails  Duration:   Greater than five years  Onset:  Gradual  Nature:  Sore with palpation.  Stable, worsening, improving:   stable  Aggravating factors:  Pain with shoe gear and ambulation.  Previous Treatment:  Debridement    Patient denies any fevers, chills, nausea, vomiting, shortness of breath or any other constitutional signs nor symptoms.      The patient is here today with his daughter.           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; Deafness; Degenerative Disc Disease ; Dementia; Diabetes mellitus type 2, noninsulin dependent; Diabetic neuropathy; Diverticula of colon; Foot pain, left; Foot pain, right; Forgetfulness; GERD; Hemorrhoid; Hyperlipidemia; Hypertension; Ingrowing nail; Ingrowing toenail; Lumbago; Lumbar compression fracture; Neurologic disorder, unspecified; Neuropathy; Parkinson disease; Polyneuropathy; Tinea unguium; Vascular disease, peripheral         Past Surgical History  Aortofemoral vein graft bypass; Carotid artery stenting; Cataract surgery         Medication List  Alcohol Pads topical pads, medicated; aspirin 81 mg oral tablet,chewable; Calcium 500 + D 500 mg(1,250mg) -200 unit oral tablet; donepezil 5 mg  "oral tablet; FreeStyle Test miscellaneous strip; Glucosamine-Chondroitin Complx 500-400 mg oral capsule; Januvia 50 mg oral tablet; Lancets,Ultra Thin miscellaneous misc; lisinopril 2.5 mg oral tablet; LSO Brace; memantine 5 mg oral tablet; Precision Xtra Monitor miscellaneous kit; primidone 50 mg oral tablet; simvastatin 40 mg oral tablet; tamsulosin 0.4 mg oral capsule; Vitamins and Minerals oral tablet         Allergy List  NO KNOWN DRUG ALLERGIES       Allergies Reconciled  Family Medical History  Family history of Arthritis; Family history of stroke         Social History  Alcohol (Never); lives with children; Retired; Tobacco (Former);          Review of Systems  · Constitutional  o Denies  o : fever, chills  · Eyes  o Denies  o : double vision  · HENT  o Denies  o : vertigo, recent head injury, hearing loss or ringing  · Cardiovascular  o Denies  o : chest pain, irregular heart beats  · Respiratory  o Denies  o : shortness of breath, productive cough  · Gastrointestinal  o Denies  o : nausea, vomiting  · Integument  o * See HPI  · Neurologic  o Admits  o : tingling or numbness  o Denies  o : altered mental status, seizures  · Musculoskeletal  o Denies  o : joint pain, joint swelling, limitation of motion      Vitals  Date Time BP Position Site L\R Cuff Size HR RR TEMP (F) WT  HT  BMI kg/m2 BSA m2 O2 Sat FR L/min FiO2 HC       06/01/2021 01:01 /54 Sitting    69 - R  97.3 132lbs 0oz 5'  5\" 21.97 1.66 97 %            Physical Examination  · Constitutional  o Appearance  o : thin, well developed, patient has dementia, appears to be chronically ill   · Eyes  o Vision  o : Patient wearing glasses.   · Respiratory  o Respiratory Effort  o : No labored breathing. Good respiratory effort.   · Cardiovascular  o Peripheral Vascular System  o :   § Pedal Pulses  § : Pedal Pulses are 2+ and symmetrical  § Extremities  § : There is no edema of the lower extremities  · Musculoskeletal  o Extremeties/Joint  o : " Lower extremity muscle strength and range of motion is equal and symmetrical bilaterally. The knees are noted to be in normal alignment. Ankle alignment and range of motion is normal and foot structure is normal.  · Skin and Subcutaneous Tissue  o General Inspection  o : no lesions present, no areas of discoloration, skin turgor normal  · Neurologic  o Sensation  o : Sharp/dull sensation is absent bilaterally. Monofilament sensation examination of the left foot is absent. Monofilament sensation examination of the right foot is absent.   · Toes  o Toes: Right Foot  o :   § Toenails  § : Toenails are hypertrophic, mycotc, dystrophic, thickened, with sublingual debris, incurvated, brittle toenail(s) at nail-1, 3, 5, Oncholysis of the foot   o Toes: Left Foot  o :   § Toenails  § : Toenails are hypertrophic, mycotc, dystrophic, thickened, with sublingual debris, incurvated, brittle toenail(s) at nail-1, 3, 5, Onycholysis of the foot   · Procedures  o Nail Debridement  o : Nail debridement is indicated for the following toenails:-left hallux, left 3rd toe, left 5th toe, right hallux, right 3rd toe, right 5th toe, The nail was debrided of excessive thickness to appropriate levels of comfort and contour using nail nippers. 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       Type 1 diabetes mellitus with diabetic polyneuropathy     250.61/E10.42      Plan  · Orders  o Debridement of six or more nails (47489, 66562, 11163, 43718, 31652, 64397, 73394, 62548, 34179, 43719, 55552) - 729.5/M79.671, 729.5/M79.672, 703.0/L60.0, 110.1/B35.1 - 06/01/2021  o ACO-17: Screened for tobacco use AND received tobacco cessation intervention (4004F, 4004F, 4004F, 4004F, 4004F, 4004F,  4004F, 4004F, 4004F, 4004F, 4004F) - 250.61/E10.42 - 06/01/2021  o Diabetic Foot (Motor and Sensory) Exam Completed [facility] (, , 2028F) - 250.61/E10.42 - 06/01/2021  · Medications  o Medications have been Reconciled  o Transition of Care or Provider Policy  · Instructions  o I have discussed the findings of this evaluation with the patient. The discussion included a complete verbal explanation of any changes in the examination results, diagnosis, and the current treatment plan. A schedule for future care needs was explained. If any questions should arise after returning home, I have encouraged the patient to feel free to contact Dr. aSntiago. The patient states understanding and agreement with this plan.   o Patient is to monitor for problems and to contact Dr. Santiago for follow-up should such signs occur. Patient states understanding and agreement with this plan.   o Follow up in 9 weeks for Routine Foot Care.   o Encouraged to follow-up with Primary Care Provider for preventative care.   o Electronically Identified Patient Education Materials Provided Electronically  · Disposition  o Call or Return if symptoms worsen or persist.            Electronically Signed by: Luis Santiago DPM -Author on June 1, 2021 01:03:21 PM

## 2021-06-05 NOTE — PROGRESS NOTES
Progress Note      Patient Name: Sage Michael   Patient ID: [id number]   Sex: Male   YOB: 1932    Primary Care Provider: Laura GTZ   Referring Provider: Jackie Davies MD    Visit Date: May 20, 2021    Provider: Jhonatan Landeros MD   Location: [facility]   Location Address: [address] [place] [address]  [id number]   Location Phone: [phone]          Chief Complaint  · Follow-up     Here for follow up of L3 fracture and to discuss MRI results.       History Of Present Illness  The patient is a 88 year old /White male who is in the office for followup appointment. Pain well-controlled. The L3 fracture is chronic, the L1 more acute. DEXA shows osteoporosis, treating with proleo.       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; Deafness; Degenerative Disc Disease ; Dementia; Diabetes mellitus type 2, noninsulin dependent; Diabetic neuropathy; Diverticula of colon; Foot pain, left; Foot pain, right; Forgetfulness; GERD; Hemorrhoid; Hyperlipidemia; Hypertension; Ingrowing nail; Ingrowing toenail; Lumbago; Neurologic disorder, unspecified; Neuropathy; Parkinson disease; Polyneuropathy; Tinea unguium; Vascular disease, peripheral         Past Surgical History  Aortofemoral vein graft bypass; Carotid artery stenting; Cataract surgery         Medication List  Alcohol Pads topical pads, medicated; aspirin 81 mg oral tablet,chewable; Calcium 500 + D 500 mg(1,250mg) -200 unit oral tablet; donepezil 5 mg oral tablet; FreeStyle Test miscellaneous strip; Glucosamine-Chondroitin Complx 500-400 mg oral capsule; Januvia 50 mg oral tablet; Lancets,Ultra Thin miscellaneous misc; lisinopril 2.5 mg oral tablet; LSO Brace; memantine 10 mg oral tablet; memantine 5 mg oral tablet; Precision Xtra Monitor miscellaneous kit; primidone 50 mg oral tablet; simvastatin 40 mg  "oral tablet; tamsulosin 0.4 mg oral capsule; Vitamins and Minerals oral tablet         Allergy List  NO KNOWN DRUG ALLERGIES       Allergies Reconciled  Family Medical History  Family history of Arthritis; Family history of stroke         Social History  Alcohol (Never); lives with children; Retired; Tobacco (Former);          Review of Systems  · Constitutional  o Denies  o : chills, excessive sweating, fatigue, fever, sycope/passing out, weight gain, weight loss  · Eyes  o Denies  o : changes in vision, blurry vision, double vision  · HENT  o Denies  o : loss of hearing, ringing in the ears, ear aches, sore throat, nasal congestion, sinus pain, nose bleeds, seasonal allergies  · Cardiovascular  o Denies  o : blood clots, swollen legs, anemia, easy burising or bleeding, transfusions  · Respiratory  o Denies  o : shortness of breath, dry cough, productive cough, pneumonia, COPD  · Gastrointestinal  o Denies  o : difficulty swallowing, reflux  · Genitourinary  o Denies  o : incontinence  · Neurologic  o Admits  o : dizziness/vertigo, difficulty with coordination, difficulty with dexterity  o Denies  o : headache, seizure, stroke, tremor, loss of balance, falls, difficulty with sleep, numbness/tingling/paresthesia , weakness  · Musculoskeletal  o Admits  o : weakness, low back pain  o Denies  o : neck stiffness/pain, swollen lymph nodes, muscle aches, joint pain, spasms, sciatica, pain radiating in arm, pain radiating in leg  · Endocrine  o Denies  o : diabetes, thyroid disorder  · Psychiatric  o Denies  o : anxiety, depression  · All Others Negative      Vitals  Date Time BP Position Site L\R Cuff Size HR RR TEMP (F) WT  HT  BMI kg/m2 BSA m2 O2 Sat FR L/min FiO2        05/20/2021 02:32 PM        97.6 131lbs 9oz 5'  5\" 21.89 1.65             Physical Examination  · Constitutional  o Appearance  o : well-nourished, well developed, alert, in no acute distress  · Respiratory  o Respiratory Effort  o : breathing " unlabored  · Cardiovascular  o Peripheral Vascular System  o :   § Extremities  § : no edema or cyanosis              Assessment  · Lumbar compression fracture     805.4/S32.000A  L1 acute, L3 chronic      Plan  · Medications  o Medications have been Reconciled  o Transition of Care or Provider Policy  · Instructions  o Call or return to office if symptoms worsen or persist.   o He will f/u with any new pain. Continue treatment for osteoporosis to reduce risk of future fracture.             Electronically Signed by: Jhonatan Landeros MD -Author on May 20, 2021 02:54:02 PM

## 2021-06-08 ENCOUNTER — OFFICE VISIT (OUTPATIENT)
Dept: NEUROLOGY | Facility: CLINIC | Age: 86
End: 2021-06-08

## 2021-06-08 DIAGNOSIS — G30.1 LATE ONSET ALZHEIMER'S DEMENTIA WITHOUT BEHAVIORAL DISTURBANCE (HCC): Primary | ICD-10-CM

## 2021-06-08 DIAGNOSIS — F02.80 LATE ONSET ALZHEIMER'S DEMENTIA WITHOUT BEHAVIORAL DISTURBANCE (HCC): Primary | ICD-10-CM

## 2021-06-08 PROBLEM — B35.1 TINEA UNGUIUM: Status: ACTIVE | Noted: 2017-07-25

## 2021-06-08 PROBLEM — K64.9 HEMORRHOID: Status: ACTIVE | Noted: 2021-06-08

## 2021-06-08 PROBLEM — K21.9 ESOPHAGEAL REFLUX: Status: ACTIVE | Noted: 2021-06-08

## 2021-06-08 PROBLEM — M54.50 LOW BACK PAIN: Status: ACTIVE | Noted: 2021-06-08

## 2021-06-08 PROBLEM — T14.8XXA BROKEN BONES: Status: ACTIVE | Noted: 2021-06-08

## 2021-06-08 PROBLEM — M19.90 ARTHRITIS: Status: ACTIVE | Noted: 2021-06-08

## 2021-06-08 PROBLEM — G62.9 NEUROPATHY: Status: ACTIVE | Noted: 2021-06-08

## 2021-06-08 PROBLEM — R68.89 FORGETFULNESS: Status: ACTIVE | Noted: 2021-06-08

## 2021-06-08 PROBLEM — H91.90 DEAFNESS: Status: ACTIVE | Noted: 2021-06-08

## 2021-06-08 PROBLEM — F03.90 DEMENTIA (HCC): Status: ACTIVE | Noted: 2021-06-08

## 2021-06-08 PROBLEM — K63.9 DISORDER OF INTESTINE: Status: ACTIVE | Noted: 2021-06-08

## 2021-06-08 PROBLEM — E11.9 DIABETES MELLITUS: Status: ACTIVE | Noted: 2017-07-25

## 2021-06-08 PROBLEM — I73.9 VASCULAR DISEASE, PERIPHERAL: Status: ACTIVE | Noted: 2021-06-08

## 2021-06-08 PROBLEM — L60.0 INGROWN NAIL: Status: ACTIVE | Noted: 2018-06-27

## 2021-06-08 PROBLEM — S32.000A LUMBAR COMPRESSION FRACTURE: Status: ACTIVE | Noted: 2021-05-20

## 2021-06-08 PROBLEM — M79.673 PAIN OF FOOT: Status: ACTIVE | Noted: 2018-01-23

## 2021-06-08 PROBLEM — G62.9 POLYNEUROPATHY: Status: ACTIVE | Noted: 2017-07-25

## 2021-06-08 PROBLEM — K57.30 DIVERTICULA OF COLON: Status: ACTIVE | Noted: 2021-06-08

## 2021-06-08 PROBLEM — H26.9 CATARACT: Status: ACTIVE | Noted: 2021-06-08

## 2021-06-08 PROCEDURE — 99213 OFFICE O/P EST LOW 20 MIN: CPT | Performed by: PSYCHIATRY & NEUROLOGY

## 2021-06-08 RX ORDER — SIMVASTATIN 40 MG
40 TABLET ORAL NIGHTLY
COMMUNITY
End: 2021-06-10 | Stop reason: SDUPTHER

## 2021-06-08 RX ORDER — TAMSULOSIN HYDROCHLORIDE 0.4 MG/1
CAPSULE ORAL
COMMUNITY
End: 2021-06-10 | Stop reason: SDUPTHER

## 2021-06-08 RX ORDER — ASPIRIN 81 MG/1
TABLET, CHEWABLE ORAL
COMMUNITY
End: 2021-06-10 | Stop reason: SDUPTHER

## 2021-06-08 RX ORDER — DONEPEZIL HYDROCHLORIDE 5 MG/1
TABLET, FILM COATED ORAL
COMMUNITY
Start: 2021-05-30 | End: 2021-06-10 | Stop reason: SDUPTHER

## 2021-06-08 RX ORDER — PRIMIDONE 50 MG/1
100 TABLET ORAL 3 TIMES DAILY
COMMUNITY
Start: 2021-04-22 | End: 2021-06-10 | Stop reason: SDUPTHER

## 2021-06-08 RX ORDER — MEMANTINE HYDROCHLORIDE 10 MG/1
TABLET ORAL
COMMUNITY
Start: 2021-05-30 | End: 2021-06-10 | Stop reason: SDUPTHER

## 2021-06-08 RX ORDER — LISINOPRIL 2.5 MG/1
2.5 TABLET ORAL DAILY
COMMUNITY
End: 2021-06-10 | Stop reason: SDUPTHER

## 2021-06-08 NOTE — PROGRESS NOTES
Chief Complaint  Memory Loss    Subjective          Sage Michael is a 89 y.o. male presents to [facility] for  History of Present Illness  89-year old man video telehealth visit.  The daughter consents to the video telehealth visit.  According to her her father is a little bit more lucid and interactive with the Donepezil 5 mg the evening and memantine 10 mg twice a day.  She still has to assist him with all activities of daily living except eating.  He has no adverse effects from the Donepezil.  He does have diverticulitis but not worsening diarrhea.  She wants the medication to remain as is and does not want increase the Donepezil.  Objective   Vital Signs:   There were no vitals taken for this visit.    Physical Exam   He is alert has difficulty in hearing his daughter but he can tell me during this visit that it is his baby daughter that is next to him by the name of Allie.  In the last clinic visit he could not give me that answer.  He is able to follow commands.     Assessment and Plan  Diagnoses and all orders for this visit:    1. Late onset Alzheimer's dementia without behavioral disturbance (CMS/HCC) (Primary)  Assessment & Plan:    He is stable from a neurologic point of view at this time with memantine and Donepezil.  He is to follow-up with his primary care provider in the future and if there is worsening of his condition the Donepezil can be increased to 10 mg nightly.  I will see him in the future as needed.             Total time spent with the patient and coordinating patient care was 20 minutes.    Follow Up  No follow-ups on file.  Patient was given instructions and counseling regarding his condition or for health maintenance advice. Please see specific information pulled into the AVS if appropriate.

## 2021-06-08 NOTE — ASSESSMENT & PLAN NOTE
He is stable from a neurologic point of view at this time with memantine and Donepezil.  He is to follow-up with his primary care provider in the future and if there is worsening of his condition the Donepezil can be increased to 10 mg nightly.  I will see him in the future as needed.

## 2021-06-10 ENCOUNTER — OFFICE VISIT (OUTPATIENT)
Dept: INTERNAL MEDICINE | Facility: CLINIC | Age: 86
End: 2021-06-10

## 2021-06-10 VITALS
TEMPERATURE: 97.9 F | HEART RATE: 73 BPM | WEIGHT: 134 LBS | OXYGEN SATURATION: 93 % | DIASTOLIC BLOOD PRESSURE: 76 MMHG | BODY MASS INDEX: 22.33 KG/M2 | HEIGHT: 65 IN | SYSTOLIC BLOOD PRESSURE: 124 MMHG

## 2021-06-10 DIAGNOSIS — G30.1 LATE ONSET ALZHEIMER'S DEMENTIA WITHOUT BEHAVIORAL DISTURBANCE (HCC): Primary | ICD-10-CM

## 2021-06-10 DIAGNOSIS — R68.89 FORGETFULNESS: ICD-10-CM

## 2021-06-10 DIAGNOSIS — E78.2 MIXED HYPERLIPIDEMIA: ICD-10-CM

## 2021-06-10 DIAGNOSIS — K21.9 GASTROESOPHAGEAL REFLUX DISEASE WITHOUT ESOPHAGITIS: ICD-10-CM

## 2021-06-10 DIAGNOSIS — F02.80 LATE ONSET ALZHEIMER'S DEMENTIA WITHOUT BEHAVIORAL DISTURBANCE (HCC): Primary | ICD-10-CM

## 2021-06-10 DIAGNOSIS — E11.42 DIABETIC POLYNEUROPATHY ASSOCIATED WITH TYPE 2 DIABETES MELLITUS (HCC): ICD-10-CM

## 2021-06-10 DIAGNOSIS — H91.93 BILATERAL DEAFNESS: ICD-10-CM

## 2021-06-10 DIAGNOSIS — E03.9 HYPOTHYROIDISM, UNSPECIFIED TYPE: ICD-10-CM

## 2021-06-10 DIAGNOSIS — G62.9 NEUROPATHY: ICD-10-CM

## 2021-06-10 PROBLEM — G20 PARKINSONISM (HCC): Status: ACTIVE | Noted: 2018-04-10

## 2021-06-10 PROBLEM — G25.0 ESSENTIAL TREMOR: Status: ACTIVE | Noted: 2018-04-10

## 2021-06-10 PROBLEM — R42 DISEQUILIBRIUM: Status: ACTIVE | Noted: 2018-04-10

## 2021-06-10 PROBLEM — G20.C PARKINSONISM: Status: ACTIVE | Noted: 2018-04-10

## 2021-06-10 PROBLEM — R41.3 MEMORY LOSS: Status: ACTIVE | Noted: 2018-10-08

## 2021-06-10 PROCEDURE — 99213 OFFICE O/P EST LOW 20 MIN: CPT | Performed by: NURSE PRACTITIONER

## 2021-06-10 RX ORDER — ASPIRIN 81 MG/1
TABLET, CHEWABLE ORAL
Status: CANCELLED | OUTPATIENT
Start: 2021-06-10

## 2021-06-10 RX ORDER — SODIUM PHOSPHATE,MONO-DIBASIC 19G-7G/118
3 ENEMA (ML) RECTAL DAILY
COMMUNITY
End: 2021-06-10 | Stop reason: SDUPTHER

## 2021-06-10 RX ORDER — MEMANTINE HYDROCHLORIDE 10 MG/1
10 TABLET ORAL DAILY
Qty: 90 TABLET | Refills: 0 | Status: SHIPPED | OUTPATIENT
Start: 2021-06-10 | End: 2021-09-14 | Stop reason: SDUPTHER

## 2021-06-10 RX ORDER — ASPIRIN 81 MG/1
81 TABLET, CHEWABLE ORAL DAILY
Qty: 90 TABLET | Refills: 0 | Status: SHIPPED | OUTPATIENT
Start: 2021-06-10 | End: 2021-09-08

## 2021-06-10 RX ORDER — PRIMIDONE 50 MG/1
100 TABLET ORAL 3 TIMES DAILY
Qty: 540 TABLET | Refills: 0 | Status: SHIPPED | OUTPATIENT
Start: 2021-06-10 | End: 2021-09-14 | Stop reason: SDUPTHER

## 2021-06-10 RX ORDER — LISINOPRIL 2.5 MG/1
2.5 TABLET ORAL DAILY
Qty: 90 TABLET | Refills: 0 | Status: SHIPPED | OUTPATIENT
Start: 2021-06-10 | End: 2021-09-14 | Stop reason: SDUPTHER

## 2021-06-10 RX ORDER — SODIUM PHOSPHATE,MONO-DIBASIC 19G-7G/118
3 ENEMA (ML) RECTAL DAILY
Qty: 270 CAPSULE | Refills: 0 | Status: SHIPPED | OUTPATIENT
Start: 2021-06-10 | End: 2021-09-08

## 2021-06-10 RX ORDER — TAMSULOSIN HYDROCHLORIDE 0.4 MG/1
1 CAPSULE ORAL DAILY
Qty: 90 CAPSULE | Refills: 0 | Status: SHIPPED | OUTPATIENT
Start: 2021-06-10 | End: 2021-09-08

## 2021-06-10 RX ORDER — DONEPEZIL HYDROCHLORIDE 5 MG/1
5 TABLET, FILM COATED ORAL NIGHTLY
Qty: 90 TABLET | Refills: 0 | Status: SHIPPED | OUTPATIENT
Start: 2021-06-10 | End: 2021-09-14 | Stop reason: SDUPTHER

## 2021-06-10 RX ORDER — SIMVASTATIN 40 MG
40 TABLET ORAL NIGHTLY
Qty: 90 TABLET | Refills: 0 | Status: SHIPPED | OUTPATIENT
Start: 2021-06-10 | End: 2021-09-14 | Stop reason: SDUPTHER

## 2021-06-10 RX ORDER — DENOSUMAB 60 MG/ML
60 INJECTION SUBCUTANEOUS
COMMUNITY

## 2021-06-10 NOTE — PROGRESS NOTES
"Chief Complaint  Hyperlipidemia and Hypothyroidism (not on thyroid medication anymore, [facility])    Subjective          Sage Michael presents to [facility]  Patient presents for follow up on chronic management. [facility]     DM- checking daily 115 on average. [facility]1C [facility] 1 month ago 6.5%     Alzheimer's - He is seeing  for neurology. Continues Namenda and aripcet. Daughter feels this is helping some. Had visit this morning with neurology.      Daughter reports the patient is doing well, [facility] trying to decrease his medication daily and his last labs were stable.     Denies soa, cp, edema.     Hyperlipidemia  Exacerbating diseases include hypothyroidism.   Hypothyroidism        Objective   Vital Signs:   /76 (BP Location: Right arm, Patient Position: Sitting, Cuff Size: Adult)   Pulse 73   Temp 97.9 °F (36.6 °C) (Temporal)   Ht 165.1 cm (65\")   Wt 60.8 kg (134 lb)   SpO2 93%   BMI 22.30 kg/m²     Physical Exam  Vitals reviewed.   Constitutional:       Appearance: Normal appearance. He is well-developed.   HENT:      Head: Normocephalic and atraumatic.      Right Ear: External ear normal.      Left Ear: External ear normal.      Mouth/Throat:      Pharynx: No oropharyngeal exudate.   Eyes:      Conjunctiva/sclera: Conjunctivae normal.      Pupils: Pupils are equal, round, and reactive to light.   Cardiovascular:      Rate and Rhythm: Normal rate and regular rhythm.      Heart sounds: No murmur heard.   No friction rub. No gallop.    Pulmonary:      Effort: Pulmonary effort is normal.      Breath sounds: Normal breath sounds. No wheezing or rhonchi.   Skin:     General: Skin is warm and dry.   Neurological:      Mental Status: He is alert and oriented to person, place, and time.      Cranial Nerves: No cranial nerve deficit.   Psychiatric:         Mood and Affect: " Affect normal.         Behavior: Behavior normal.         Thought Content: Thought content normal.        Result Review :                 Assessment and Plan    Diagnoses and all orders for this visit:    1. Late onset Alzheimer's dementia without behavioral disturbance (CMS/HCC) (Primary)  Comments:  Patient continues on Namenda and aricept. Continue follow up with neuro.     2. Forgetfulness    3. Neuropathy    4. Diabetic polyneuropathy associated with type 2 diabetes mellitus (CMS/HCC)  Comments:  Patient continues januvia 50 mg. [facility] reported to [facility] 6.3%. Well controlled.     5. Gastroesophageal reflux disease without esophagitis    6. Bilateral deafness    7. Mixed hyperlipidemia  Comments:  Patient continues on zocor, will [facility].     8. Hypothyroidism, unspecified type  Comments:  labs ordered for 2 months. Thyroid profile.   Orders:  -     Thyroid Panel With TSH; Future    Other orders  -     Cancel: CBC & Differential  -     Cancel: PSA Screen  -     Cancel: Comprehensive Metabolic Panel  -     Cancel: Lipid Panel  -     Cancel: Hemoglobin A1c  -     Cancel: Microalbumin / Creatinine Urine Ratio - Urine, Clean Catch  -     Cancel: Thyroid Panel With TSH  -     Cancel: aspirin 81 MG chewable tablet  -     primidone (MYSOLINE) 50 MG tablet; Take 2 tablets by mouth 3 (Three) Times a Day for 90 days.  Dispense: 540 tablet; Refill: 0  -     simvastatin (ZOCOR) 40 MG tablet; Take 1 tablet by mouth Every Night for 90 days.  Dispense: 90 tablet; Refill: 0  -     SITagliptin (JANUVIA) 50 MG tablet; Take 1 tablet by mouth Daily for 90 days.  Dispense: 90 tablet; Refill: 0  -     lisinopril (PRINIVIL,ZESTRIL) 2.5 MG tablet; Take 1 tablet by mouth Daily for 90 days.  Dispense: 90 tablet; Refill: 0  -     memantine (NAMENDA) 10 MG tablet; Take 1 tablet by mouth Daily for 90 days.  Dispense: 90 tablet; Refill: 0  -     donepezil (ARICEPT) 5 MG tablet; Take 1 tablet by mouth Every Night.  Dispense: 90  tablet; Refill: 0  -     Calcium Carbonate-Vitamin D (calcium-vitamin D) 500-200 MG-UNIT tablet per tablet; Take 1 tablet by mouth Daily for 90 days.  Dispense: 90 tablet; Refill: 0  -     glucosamine-chondroitin (Glucosamine Chondr Complex) 500-400 MG capsule capsule; Take 3 capsules by mouth Daily for 90 days.  Dispense: 270 capsule; Refill: 0  -     tamsulosin (FLOMAX) 0.4 MG capsule 24 hr capsule; Take 1 capsule by mouth Daily for 90 days.  Dispense: 90 capsule; Refill: 0  -     aspirin 81 MG chewable tablet; Chew 1 tablet Daily for 90 days.  Dispense: 90 tablet; Refill: 0        Follow Up   Return in about 3 months (around 9/10/2021) for Next scheduled follow up.  Patient was given instructions and counseling regarding his condition or for health maintenance advice. Please see specific information pulled into the AVS if appropriate.

## 2021-06-10 NOTE — PATIENT INSTRUCTIONS
Alzheimer's Disease  Alzheimer's disease is a brain disease that affects memory, thinking, language, and behavior. People with Alzheimer's disease lose mental abilities, and the disease gets worse over time. Alzheimer's disease is a form of dementia.  What are the causes?  This condition develops when a protein called beta-amyloid forms deposits in the brain. It is not known what causes these deposits to form.  Alzheimer's disease may also be caused by a gene mutation that is inherited from one parent or both parents. A gene mutation is a harmful change in a gene. Not everyone who inherits the genetic mutation will get the disease.  What increases the risk?  You are more likely to develop this condition if you:  · Are older than age 65.  · Have a family history of dementia.  · Have had a brain injury.  · Have heart or blood vessel disease.  · Have had a stroke.  · Have high blood pressure or high cholesterol.  · Have diabetes.  What are the signs or symptoms?  Symptoms of this condition may happen in three stages, which often overlap.  Early stage  In this stage, you may continue to be independent. You may still be able to drive, work, and be social. Symptoms in this stage include:  · Minor memory problems, such as forgetting a name or what you read.  · Difficulty with:  ? Paying attention.  ? Communicating.  ? Doing familiar tasks.  ? Problem solving or doing calculations.  ? Following instructions.  ? Learning new things.  · Anxiety.  · Social withdrawal.  · Loss of motivation.  Moderate stage  In this stage, you will start to need care. Symptoms in this stage include:  · Difficulty with expressing thoughts.  · Memory loss that affects daily life. This can include forgetting:  ? Your address or phone number.  ? Recent events that have happened.  ? Parts of your personal history, such as where you went to school.  · Confusion about where you are or what time it is.  · Difficulty in judging distance.  · Changes in  personality, mood, and behavior. You may be capellan, irritable, angry, frustrated, fearful, anxious, or suspicious.  · Poor reasoning and judgment.  · Delusions or hallucinations.  · Changes in sleep patterns.  · Wandering and getting lost, even in familiar places.  Severe stage  In the final stage, you will need help with your personal care and daily activities. Symptoms in this stage include:  · Worsening memory loss.  · Personality changes.  · Loss of awareness of your surroundings.  · Changes in physical abilities, including the ability to walk, sit, and swallow.  · Difficulty in communicating.  · Inability to control your bladder and bowels.  · Increasing confusion.  · Increasing behavior changes.  How is this diagnosed?  This condition is diagnosed by a health care provider who specializes in diseases of the nervous system (neurologist). Other causes of dementia may also be ruled out. Your health care provider will talk with you and your family, friends, or caregivers about your history and symptoms.  A thorough medical history will be taken, and you will have a physical exam and tests. Tests may include:  · Lab tests, such as blood or urine tests.  · Imaging tests, such as a CT scan, a PET scan, or an MRI.  · A lumbar puncture. This test involves removing and testing a small amount of the fluid that surrounds the brain and spinal cord.  · An electroencephalogram (EEG). In this test, small metal discs are used to measure electrical activity in the brain.  · Memory tests, cognitive tests, and neuropsychological tests. These tests evaluate brain function.  · Genetic testing may be done if you have early onset of the disease (before age 60) or if other family members have the disease.  How is this treated?  At this time, there is no treatment to cure Alzheimer's disease or stop it from getting worse. The goals of treatment are:  · To slow down symptoms of the disease, if possible.  · To manage behavioral  changes.  · To provide you with a safe environment.  · To help manage daily life for you and your caregivers.  The following treatment options are available:  · Medicines. Medicines may help to slow down memory loss and manage behavioral symptoms.  · Cognitive therapy. Cognitive therapy provides you with education, support, and memory aids. It is most helpful in the early stages of the condition.  · Counseling or spiritual guidance. It is normal to have a lot of feelings, including anger, relief, fear, and isolation. Counseling and guidance can help you deal with these feelings.  · Caregiving. This involves having caregivers help you with your daily activities.  · Family support groups. These provide education, emotional support, and information about community resources to family members who are taking care of you.  Follow these instructions at home:    Medicines  · Take over-the-counter and prescription medicines only as told by your health care provider.  · Use a pill organizer or pill reminder to help you manage your medicines.  · Avoid taking medicines that can affect thinking, such as pain medicines or sleeping medicines.  Lifestyle  · Make healthy lifestyle choices:  ? Be physically active as told by your health care provider. Regular exercise may help improve symptoms.  ? Do not use any products that contain nicotine or tobacco, such as cigarettes, e-cigarettes, and chewing tobacco. If you need help quitting, ask your health care provider.  ? Do not drink alcohol.  ? Eat a healthy diet.  ? Practice stress-management techniques when you get stressed.  ? Stay social.  · Drink enough fluid to keep your urine pale yellow.  · Make sure to get quality sleep.  ? Avoid taking long naps during the day. Take short naps of 30 minutes or less if needed.  ? Keep your sleeping area dark and cool.  ? Avoid exercising during the few hours before you go to bed.  ? Avoid caffeine products in the afternoon and  evening.  General instructions  · Work with your health care provider to determine what you need help with and what your safety needs are.  · If you were given a bracelet that identifies you as a person with memory loss or tracks your location, make sure to wear it at all times.  · Talk with your health care provider about whether it is safe for you to drive.  · Work with your family to make important decisions, such as advance directives, medical power of , or a living will.  · Keep all follow-up visits as told by your health care provider. This is important.  Where to find more information  · The Alzheimer's Association: Call the 24-hour helpline at 1-441.763.9862, or visit www.alz.org  Contact a health care provider if:  · You have nausea, vomiting, or trouble with eating.  · You have dizziness or weakness.  · You or your family members become concerned for your safety.  Get help right away if:  · You feel depressed or sad, or feel that you want to harm yourself.  · You develop chest pain or difficulty with breathing.  · You pass out.  If you ever feel like you may hurt yourself or others, or have thoughts about taking your own life, get help right away. You can go to your nearest emergency department or call:  · Your local emergency services (911 in the [place]  · A suicide crisis helpline, such as the National Suicide Prevention Lifeline at 1-217.171.7888. This is open 24 hours a day.  Summary  · Alzheimer's disease is a brain disease that affects memory, thinking, language, and behavior. Alzheimer's disease is a form of dementia.  · This condition is diagnosed by a specialist in diseases of the nervous system (neurologist).  · At this time, there is no treatment to cure Alzheimer's disease or stop it from getting worse. The goals of treatment are to slow memory loss and help you manage any symptoms.  · Work with your family to make important decisions, such as advance directives, medical power of  , or a living will.  This information is not intended to replace advice given to you by your health care provider. Make sure you discuss any questions you have with your health care provider.  Document Revised: 11/26/2019 Document Reviewed: 11/26/2019  Elsevier Patient Education © 2021 [employer]

## 2021-07-15 VITALS
SYSTOLIC BLOOD PRESSURE: 129 MMHG | OXYGEN SATURATION: 97 % | HEIGHT: 65 IN | DIASTOLIC BLOOD PRESSURE: 54 MMHG | TEMPERATURE: 97.3 F | WEIGHT: 132 LBS | HEART RATE: 69 BPM | BODY MASS INDEX: 21.99 KG/M2

## 2021-07-15 VITALS — BODY MASS INDEX: 21.92 KG/M2 | TEMPERATURE: 97.6 F | HEIGHT: 65 IN | WEIGHT: 131.56 LBS

## 2021-09-09 ENCOUNTER — TELEPHONE (OUTPATIENT)
Dept: INTERNAL MEDICINE | Facility: CLINIC | Age: 86
End: 2021-09-09

## 2021-09-09 ENCOUNTER — CLINICAL SUPPORT (OUTPATIENT)
Dept: INTERNAL MEDICINE | Facility: CLINIC | Age: 86
End: 2021-09-09

## 2021-09-09 DIAGNOSIS — E03.9 HYPOTHYROIDISM, UNSPECIFIED TYPE: Primary | ICD-10-CM

## 2021-09-09 DIAGNOSIS — E03.9 HYPOTHYROIDISM, UNSPECIFIED TYPE: ICD-10-CM

## 2021-09-09 LAB
T-UPTAKE NFR SERPL: 1.11 TBI (ref 0.8–1.3)
T4 SERPL-MCNC: 4.56 MCG/DL (ref 4.5–11.7)
TSH SERPL DL<=0.05 MIU/L-ACNC: 6.53 UIU/ML (ref 0.27–4.2)

## 2021-09-09 PROCEDURE — 84436 ASSAY OF TOTAL THYROXINE: CPT | Performed by: NURSE PRACTITIONER

## 2021-09-09 PROCEDURE — 84479 ASSAY OF THYROID (T3 OR T4): CPT | Performed by: NURSE PRACTITIONER

## 2021-09-09 PROCEDURE — 84443 ASSAY THYROID STIM HORMONE: CPT | Performed by: NURSE PRACTITIONER

## 2021-09-09 PROCEDURE — 36415 COLL VENOUS BLD VENIPUNCTURE: CPT | Performed by: NURSE PRACTITIONER

## 2021-09-09 NOTE — TELEPHONE ENCOUNTER
Caller: SEJAL MESA    Relationship: Emergency Contact    Best call back number: [phone]     What was the call regarding: PATENTS DAUGHTER CALLED WANTING TO KNOW WHEN AND WHERE HER FATHER GETS HIS PROLIA SHOT.  HE IS DUE FOR IT EARLY October.    Do you require a callback: YES PLEASE CALL AND ADVISE

## 2021-09-10 RX ORDER — LEVOTHYROXINE SODIUM 0.05 MG/1
50 TABLET ORAL DAILY
Qty: 90 TABLET | Refills: 0 | Status: SHIPPED | OUTPATIENT
Start: 2021-09-10 | End: 2021-09-14 | Stop reason: SDUPTHER

## 2021-09-10 NOTE — PROGRESS NOTES
I sent synthroid to the pharmacy for the patient to start. I ordered repeat labs for 2 mths. Please let daughter know that he is hypothyroid and needs a supplement. This will likely make him feel less fatigued. If he complains of palpitations, chest pain or has other issues, call for repeat lab sooner than 2 mths. May need to titrate dose up which is why repeat labs are needed.

## 2021-09-13 DIAGNOSIS — M81.0 AGE-RELATED OSTEOPOROSIS WITHOUT CURRENT PATHOLOGICAL FRACTURE: Primary | ICD-10-CM

## 2021-09-14 ENCOUNTER — OFFICE VISIT (OUTPATIENT)
Dept: INTERNAL MEDICINE | Facility: CLINIC | Age: 86
End: 2021-09-14

## 2021-09-14 VITALS
HEART RATE: 66 BPM | HEIGHT: 65 IN | SYSTOLIC BLOOD PRESSURE: 116 MMHG | BODY MASS INDEX: 22.89 KG/M2 | WEIGHT: 137.4 LBS | DIASTOLIC BLOOD PRESSURE: 58 MMHG | TEMPERATURE: 97.2 F | OXYGEN SATURATION: 95 %

## 2021-09-14 DIAGNOSIS — F03.90 DEMENTIA WITHOUT BEHAVIORAL DISTURBANCE, UNSPECIFIED DEMENTIA TYPE: ICD-10-CM

## 2021-09-14 DIAGNOSIS — E03.9 HYPOTHYROIDISM, UNSPECIFIED TYPE: Primary | ICD-10-CM

## 2021-09-14 DIAGNOSIS — E78.2 MIXED HYPERLIPIDEMIA: ICD-10-CM

## 2021-09-14 DIAGNOSIS — E11.9 TYPE 2 DIABETES MELLITUS WITHOUT COMPLICATION, WITHOUT LONG-TERM CURRENT USE OF INSULIN (HCC): ICD-10-CM

## 2021-09-14 PROCEDURE — 99214 OFFICE O/P EST MOD 30 MIN: CPT | Performed by: NURSE PRACTITIONER

## 2021-09-14 RX ORDER — LISINOPRIL 2.5 MG/1
2.5 TABLET ORAL DAILY
Qty: 90 TABLET | Refills: 1 | Status: SHIPPED | OUTPATIENT
Start: 2021-09-14 | End: 2021-10-27 | Stop reason: SDUPTHER

## 2021-09-14 RX ORDER — LEVOTHYROXINE SODIUM 0.05 MG/1
50 TABLET ORAL DAILY
Qty: 90 TABLET | Refills: 1 | Status: SHIPPED | OUTPATIENT
Start: 2021-09-14 | End: 2021-10-27 | Stop reason: SDUPTHER

## 2021-09-14 RX ORDER — BLOOD-GLUCOSE METER
1 KIT MISCELLANEOUS AS NEEDED
COMMUNITY
End: 2021-09-22 | Stop reason: SDUPTHER

## 2021-09-14 RX ORDER — LISINOPRIL 2.5 MG/1
2.5 TABLET ORAL DAILY
COMMUNITY
End: 2021-09-14 | Stop reason: SDUPTHER

## 2021-09-14 RX ORDER — SIMVASTATIN 40 MG
40 TABLET ORAL NIGHTLY
Qty: 90 TABLET | Refills: 1 | Status: SHIPPED | OUTPATIENT
Start: 2021-09-14 | End: 2021-10-27 | Stop reason: SDUPTHER

## 2021-09-14 RX ORDER — PRIMIDONE 50 MG/1
50 TABLET ORAL 4 TIMES DAILY
COMMUNITY
End: 2021-09-14 | Stop reason: SDUPTHER

## 2021-09-14 RX ORDER — PRIMIDONE 50 MG/1
100 TABLET ORAL 3 TIMES DAILY
Qty: 540 TABLET | Refills: 1 | Status: SHIPPED | OUTPATIENT
Start: 2021-09-14 | End: 2021-10-27 | Stop reason: SDUPTHER

## 2021-09-14 RX ORDER — TAMSULOSIN HYDROCHLORIDE 0.4 MG/1
1 CAPSULE ORAL DAILY
COMMUNITY
End: 2021-09-20 | Stop reason: SDUPTHER

## 2021-09-14 RX ORDER — DONEPEZIL HYDROCHLORIDE 5 MG/1
5 TABLET, FILM COATED ORAL NIGHTLY
Qty: 90 TABLET | Refills: 1 | Status: SHIPPED | OUTPATIENT
Start: 2021-09-14 | End: 2021-10-27 | Stop reason: SDUPTHER

## 2021-09-14 RX ORDER — SIMVASTATIN 40 MG
40 TABLET ORAL NIGHTLY
COMMUNITY
End: 2021-09-14 | Stop reason: SDUPTHER

## 2021-09-14 RX ORDER — MEMANTINE HYDROCHLORIDE 10 MG/1
10 TABLET ORAL DAILY
Qty: 90 TABLET | Refills: 1 | Status: SHIPPED | OUTPATIENT
Start: 2021-09-14 | End: 2021-10-27 | Stop reason: SDUPTHER

## 2021-09-14 NOTE — PROGRESS NOTES
"Chief Complaint  Labs Only (follow up), Med Refill (refill on all), and other (daughter will be leaving for a while and would like for some of her fathers rx's to be more than just a months worth (donepezil, memantine, primidone, synthroid))    Subjective          Sage Michael presents to [facility]  Doing well currently        Objective   Vital Signs:   /58 (BP Location: Right arm, Patient Position: Sitting, Cuff Size: Adult)   Pulse 66   Temp 97.2 °F (36.2 °C) (Temporal)   Ht 165.1 cm (65\")   Wt 62.3 kg (137 lb 6.4 oz)   SpO2 95%   BMI 22.86 kg/m²     Physical Exam   Result Review :          Procedures      Assessment and Plan    Diagnoses and all orders for this visit:    1. Hypothyroidism, unspecified type (Primary)  -     Thyroid Panel With TSH; Future    2. Mixed hyperlipidemia    3. Type 2 diabetes mellitus without complication, without long-term current use of insulin (CMS/[facility])    4. Dementia without behavioral disturbance, unspecified dementia type (CMS/[facility])    Other orders  -     primidone (MYSOLINE) 50 MG tablet; Take 2 tablets by mouth 3 (Three) Times a Day for 180 days.  Dispense: 540 tablet; Refill: 1  -     simvastatin (ZOCOR) 40 MG tablet; Take 1 tablet by mouth Every Night for 180 days.  Dispense: 90 tablet; Refill: 1  -     SITagliptin (JANUVIA) 50 MG tablet; Take 1 tablet by mouth Daily for 90 days.  Dispense: 90 tablet; Refill: 0  -     lisinopril (PRINIVIL,ZESTRIL) 2.5 MG tablet; Take 1 tablet by mouth Daily for 180 days.  Dispense: 90 tablet; Refill: 1  -     memantine (NAMENDA) 10 MG tablet; Take 1 tablet by mouth Daily for 180 days.  Dispense: 90 tablet; Refill: 1  -     donepezil (ARICEPT) 5 MG tablet; Take 1 tablet by mouth Every Night for 180 days.  Dispense: 90 tablet; Refill: 1  -     levothyroxine (Synthroid) 50 MCG tablet; Take 1 tablet by mouth Daily for 180 days.  Dispense: 90 tablet; Refill: 1              Follow Up "   No follow-ups on file.  Patient was given instructions and counseling regarding his condition or for health maintenance advice. Please see specific information pulled into the AVS if appropriate.

## 2021-09-17 ENCOUNTER — HOSPITAL ENCOUNTER (OUTPATIENT)
Dept: INFUSION THERAPY | Facility: HOSPITAL | Age: 86
Setting detail: INFUSION SERIES
Discharge: HOME | End: 2021-09-17

## 2021-09-17 VITALS
SYSTOLIC BLOOD PRESSURE: 140 MMHG | TEMPERATURE: 98.4 F | DIASTOLIC BLOOD PRESSURE: 59 MMHG | RESPIRATION RATE: 20 BRPM | WEIGHT: 136.02 LBS | BODY MASS INDEX: 22.66 KG/M2 | HEART RATE: 70 BPM | OXYGEN SATURATION: 96 % | HEIGHT: 65 IN

## 2021-09-17 DIAGNOSIS — M81.0 AGE-RELATED OSTEOPOROSIS WITHOUT CURRENT PATHOLOGICAL FRACTURE: Primary | ICD-10-CM

## 2021-09-17 PROCEDURE — 25010000002 DENOSUMAB 60 MG/ML SOLUTION PREFILLED SYRINGE: Performed by: NURSE PRACTITIONER

## 2021-09-17 PROCEDURE — 96372 THER/PROPH/DIAG INJ SC/IM: CPT

## 2021-09-17 PROCEDURE — 36415 COLL VENOUS BLD VENIPUNCTURE: CPT

## 2021-09-17 PROCEDURE — 80053 COMPREHEN METABOLIC PANEL: CPT | Performed by: NURSE PRACTITIONER

## 2021-09-17 RX ADMIN — DENOSUMAB 60 MG: 60 INJECTION SUBCUTANEOUS at 14:02

## 2021-09-20 RX ORDER — DONEPEZIL HYDROCHLORIDE 5 MG/1
5 TABLET, FILM COATED ORAL NIGHTLY
Qty: 90 TABLET | Refills: 1 | Status: CANCELLED | OUTPATIENT
Start: 2021-09-20 | End: 2022-03-19

## 2021-09-20 NOTE — TELEPHONE ENCOUNTER
Caller: BONITA SEJAL    Relationship: Emergency Contact    Best call back number: [phone]     Medication needed:   Requested Prescriptions     Pending Prescriptions Disp Refills   • donepezil (ARICEPT) 5 MG tablet 90 tablet 1     Sig: Take 1 tablet by mouth Every Night for 180 days.   • tamsulosin (FLOMAX) 0.4 MG capsule 24 hr capsule 30 capsule      Sig: Take 1 capsule by mouth Daily.     TEST STRIPS FOR FREESTYLE GLUCOMETER    When do you need the refill by: ASAP    Does the patient have less than a 3 day supply:  [] Yes  [x] No    What is the patient's [facility] pharmacy[facility] [place] [place] [facility] [address] [phone] [facility] [phone]      PATIENT REQUESTS CALL BACK WHEN PRESCRIPTIONS HAVE BEEN SENT

## 2021-09-22 RX ORDER — TAMSULOSIN HYDROCHLORIDE 0.4 MG/1
1 CAPSULE ORAL DAILY
Qty: 90 CAPSULE | Refills: 0 | Status: SHIPPED | OUTPATIENT
Start: 2021-09-22 | End: 2021-10-27 | Stop reason: SDUPTHER

## 2021-09-22 RX ORDER — BLOOD-GLUCOSE METER
1 KIT MISCELLANEOUS AS NEEDED
Qty: 5 EACH | Refills: 1 | Status: SHIPPED | OUTPATIENT
Start: 2021-09-22 | End: 2021-10-27 | Stop reason: SDUPTHER

## 2021-10-27 NOTE — TELEPHONE ENCOUNTER
Caller: SEJAL MESA    Relationship: Emergency Contact      Medication requested (name and dosage):     Requested Prescriptions:   Requested Prescriptions     Pending Prescriptions Disp Refills   • SITagliptin (JANUVIA) 50 MG tablet 90 tablet 0     Sig: Take 1 tablet by mouth Daily for 90 days.   • tamsulosin (FLOMAX) 0.4 MG capsule 24 hr capsule 90 capsule 0     Sig: Take 1 capsule by mouth Daily.   • donepezil (ARICEPT) 5 MG tablet 90 tablet 1     Sig: Take 1 tablet by mouth Every Night for 180 days.   • glucose blood (FREESTYLE LITE) test strip 5 each 1     Si each by Other route As Needed (use as directed). Use as instructed   • levothyroxine (Synthroid) 50 MCG tablet 90 tablet 1     Sig: Take 1 tablet by mouth Daily for 180 days.   • lisinopril (PRINIVIL,ZESTRIL) 2.5 MG tablet 90 tablet 1     Sig: Take 1 tablet by mouth Daily for 180 days.   • memantine (NAMENDA) 10 MG tablet 90 tablet 1     Sig: Take 1 tablet by mouth Daily for 180 days.   • primidone (MYSOLINE) 50 MG tablet 540 tablet 1     Sig: Take 2 tablets by mouth 3 (Three) Times a Day for 180 days.   • simvastatin (ZOCOR) 40 MG tablet 90 tablet 1     Sig: Take 1 tablet by mouth Every Night for 180 days.   • denosumab (Prolia) 60 MG/ML solution prefilled syringe syringe 180 mL      Sig: Inject 1 mL under the skin into the appropriate area as directed Every 6 (Six) Months.        Pharm[facility] sent[facility]  [place] [place] [facility] [address] [phone] [facility] [phone]   [phone]    Additional details provided by patient: PATIENT'S DAUGHTER CALLED TO SAY THAT THE FACILITY THAT HE IS STAYING IN LOST ALL OF HIS MEDICATIONS AND NEEDS REFILLS.      Best call back number:[phone]    Does the patient have less than a 3 day supply:  [x] Yes  [] No    Leobardo Elizalde Rep   10/27/21 09:47 EDT

## 2021-10-27 NOTE — TELEPHONE ENCOUNTER
Caller: SEJAL MESA    Relationship: Emergency Contact    Best call back number:[phone]     What is the best time to reach you:ANYTIME  Who are you requesting to speak with (clinical staff, provider,  specific staff member): CLINICAL        What was the call regarding:PATIENTS DAUGHTER WANTS A CALL BACK TO DISCUSS DOSAGE OF THE MEMANTINE 10 MG TABLET.    Do you require a callback: YES

## 2021-10-27 NOTE — TELEPHONE ENCOUNTER
Called and spoke with Allie patient's daughter to confirm previous message that was sent to office. Patient's daughter is wanting to know is patient suppose to take Memantine 10 mg 2x daily or once because the bottle she they have now it says 1x and from her knowledge she says it is 2x daily. Can you please confirm the dosage.

## 2021-10-28 RX ORDER — LISINOPRIL 2.5 MG/1
2.5 TABLET ORAL DAILY
Qty: 90 TABLET | Refills: 1 | Status: SHIPPED | OUTPATIENT
Start: 2021-10-28 | End: 2022-03-21 | Stop reason: SDUPTHER

## 2021-10-28 RX ORDER — SIMVASTATIN 40 MG
40 TABLET ORAL NIGHTLY
Qty: 90 TABLET | Refills: 1 | Status: SHIPPED | OUTPATIENT
Start: 2021-10-28 | End: 2022-04-26

## 2021-10-28 RX ORDER — BLOOD-GLUCOSE METER
1 KIT MISCELLANEOUS AS NEEDED
Qty: 5 EACH | Refills: 1 | Status: SHIPPED | OUTPATIENT
Start: 2021-10-28

## 2021-10-28 RX ORDER — MEMANTINE HYDROCHLORIDE 10 MG/1
10 TABLET ORAL 2 TIMES DAILY
Qty: 180 TABLET | Refills: 0 | Status: SHIPPED | OUTPATIENT
Start: 2021-10-28 | End: 2022-01-10 | Stop reason: SDUPTHER

## 2021-10-28 RX ORDER — PRIMIDONE 50 MG/1
100 TABLET ORAL 3 TIMES DAILY
Qty: 540 TABLET | Refills: 1 | Status: SHIPPED | OUTPATIENT
Start: 2021-10-28 | End: 2022-03-21 | Stop reason: SDUPTHER

## 2021-10-28 RX ORDER — DONEPEZIL HYDROCHLORIDE 5 MG/1
5 TABLET, FILM COATED ORAL NIGHTLY
Qty: 90 TABLET | Refills: 1 | Status: SHIPPED | OUTPATIENT
Start: 2021-10-28 | End: 2022-03-21 | Stop reason: SDUPTHER

## 2021-10-28 RX ORDER — TAMSULOSIN HYDROCHLORIDE 0.4 MG/1
1 CAPSULE ORAL DAILY
Qty: 90 CAPSULE | Refills: 0 | Status: SHIPPED | OUTPATIENT
Start: 2021-10-28 | End: 2022-01-10 | Stop reason: SDUPTHER

## 2021-10-28 RX ORDER — LEVOTHYROXINE SODIUM 0.05 MG/1
50 TABLET ORAL DAILY
Qty: 90 TABLET | Refills: 1 | Status: SHIPPED | OUTPATIENT
Start: 2021-10-28 | End: 2022-03-21 | Stop reason: SDUPTHER

## 2021-10-28 RX ORDER — DENOSUMAB 60 MG/ML
60 INJECTION SUBCUTANEOUS
Qty: 180 ML | Status: CANCELLED | OUTPATIENT
Start: 2021-10-28

## 2021-10-28 NOTE — TELEPHONE ENCOUNTER
Called patient's daughter Allie and informed her of the medication approval and the correct dosage for patient. She stated will keep him on the 2x daily and if anything changes with him than she may considered changing.

## 2021-10-28 NOTE — TELEPHONE ENCOUNTER
Typically for the immediate release dosing we do it twice daily but often titrate up using daily dosing. He can take 10mg  Bid so I will send it in that way. They do make an extended release option so we could always  Switch if the twice daily dosing is difficult for him. Please call and let the daughter, Allie, know

## 2021-12-29 ENCOUNTER — OFFICE VISIT (OUTPATIENT)
Dept: INTERNAL MEDICINE | Facility: CLINIC | Age: 86
End: 2021-12-29

## 2021-12-29 VITALS
OXYGEN SATURATION: 94 % | RESPIRATION RATE: 12 BRPM | HEIGHT: 65 IN | BODY MASS INDEX: 22.66 KG/M2 | TEMPERATURE: 98 F | HEART RATE: 76 BPM | WEIGHT: 136 LBS | SYSTOLIC BLOOD PRESSURE: 130 MMHG | DIASTOLIC BLOOD PRESSURE: 70 MMHG

## 2021-12-29 DIAGNOSIS — E03.9 HYPOTHYROIDISM, UNSPECIFIED TYPE: ICD-10-CM

## 2021-12-29 DIAGNOSIS — R53.83 FATIGUE, UNSPECIFIED TYPE: ICD-10-CM

## 2021-12-29 DIAGNOSIS — R53.1 WEAKNESS: ICD-10-CM

## 2021-12-29 DIAGNOSIS — R06.09 DYSPNEA ON EXERTION: ICD-10-CM

## 2021-12-29 DIAGNOSIS — J44.1 COPD WITH EXACERBATION (HCC): Primary | ICD-10-CM

## 2021-12-29 DIAGNOSIS — M79.89 LEG SWELLING: ICD-10-CM

## 2021-12-29 DIAGNOSIS — R05.9 COUGH: ICD-10-CM

## 2021-12-29 LAB
ALBUMIN SERPL-MCNC: 4.3 G/DL (ref 3.5–5.2)
ALBUMIN/GLOB SERPL: 1.5 G/DL
ALP SERPL-CCNC: 115 U/L (ref 39–117)
ALT SERPL W P-5'-P-CCNC: 40 U/L (ref 1–41)
ANION GAP SERPL CALCULATED.3IONS-SCNC: 7.7 MMOL/L (ref 5–15)
AST SERPL-CCNC: 21 U/L (ref 1–40)
BASOPHILS # BLD AUTO: 0.03 10*3/MM3 (ref 0–0.2)
BASOPHILS NFR BLD AUTO: 0.4 % (ref 0–1.5)
BILIRUB SERPL-MCNC: 0.3 MG/DL (ref 0–1.2)
BUN SERPL-MCNC: 17 MG/DL (ref 8–23)
BUN/CREAT SERPL: 13.8 (ref 7–25)
CALCIUM SPEC-SCNC: 9.6 MG/DL (ref 8.6–10.5)
CHLORIDE SERPL-SCNC: 108 MMOL/L (ref 98–107)
CO2 SERPL-SCNC: 29.3 MMOL/L (ref 22–29)
CREAT SERPL-MCNC: 1.23 MG/DL (ref 0.76–1.27)
DEPRECATED RDW RBC AUTO: 44.8 FL (ref 37–54)
EOSINOPHIL # BLD AUTO: 0.17 10*3/MM3 (ref 0–0.4)
EOSINOPHIL NFR BLD AUTO: 2.3 % (ref 0.3–6.2)
ERYTHROCYTE [DISTWIDTH] IN BLOOD BY AUTOMATED COUNT: 12.5 % (ref 12.3–15.4)
GFR SERPL CREATININE-BSD FRML MDRD: 55 ML/MIN/1.73
GLOBULIN UR ELPH-MCNC: 2.8 GM/DL
GLUCOSE SERPL-MCNC: 144 MG/DL (ref 65–99)
HCT VFR BLD AUTO: 39 % (ref 37.5–51)
HGB BLD-MCNC: 12.6 G/DL (ref 13–17.7)
IMM GRANULOCYTES # BLD AUTO: 0.04 10*3/MM3 (ref 0–0.05)
IMM GRANULOCYTES NFR BLD AUTO: 0.5 % (ref 0–0.5)
LYMPHOCYTES # BLD AUTO: 1.72 10*3/MM3 (ref 0.7–3.1)
LYMPHOCYTES NFR BLD AUTO: 23.3 % (ref 19.6–45.3)
MAGNESIUM SERPL-MCNC: 2.1 MG/DL (ref 1.6–2.4)
MCH RBC QN AUTO: 31.7 PG (ref 26.6–33)
MCHC RBC AUTO-ENTMCNC: 32.3 G/DL (ref 31.5–35.7)
MCV RBC AUTO: 98.2 FL (ref 79–97)
MONOCYTES # BLD AUTO: 0.75 10*3/MM3 (ref 0.1–0.9)
MONOCYTES NFR BLD AUTO: 10.1 % (ref 5–12)
NEUTROPHILS NFR BLD AUTO: 4.68 10*3/MM3 (ref 1.7–7)
NEUTROPHILS NFR BLD AUTO: 63.4 % (ref 42.7–76)
NRBC BLD AUTO-RTO: 0 /100 WBC (ref 0–0.2)
PLATELET # BLD AUTO: 152 10*3/MM3 (ref 140–450)
PMV BLD AUTO: 11.1 FL (ref 6–12)
POTASSIUM SERPL-SCNC: 4.9 MMOL/L (ref 3.5–5.2)
PROT SERPL-MCNC: 7.1 G/DL (ref 6–8.5)
RBC # BLD AUTO: 3.97 10*6/MM3 (ref 4.14–5.8)
SODIUM SERPL-SCNC: 145 MMOL/L (ref 136–145)
T-UPTAKE NFR SERPL: 1.01 TBI (ref 0.8–1.3)
T4 SERPL-MCNC: 5.66 MCG/DL (ref 4.5–11.7)
TSH SERPL DL<=0.05 MIU/L-ACNC: 2.18 UIU/ML (ref 0.27–4.2)
WBC NRBC COR # BLD: 7.39 10*3/MM3 (ref 3.4–10.8)

## 2021-12-29 PROCEDURE — 85025 COMPLETE CBC W/AUTO DIFF WBC: CPT | Performed by: NURSE PRACTITIONER

## 2021-12-29 PROCEDURE — 36415 COLL VENOUS BLD VENIPUNCTURE: CPT | Performed by: NURSE PRACTITIONER

## 2021-12-29 PROCEDURE — 83735 ASSAY OF MAGNESIUM: CPT | Performed by: NURSE PRACTITIONER

## 2021-12-29 PROCEDURE — 84479 ASSAY OF THYROID (T3 OR T4): CPT | Performed by: NURSE PRACTITIONER

## 2021-12-29 PROCEDURE — 84443 ASSAY THYROID STIM HORMONE: CPT | Performed by: NURSE PRACTITIONER

## 2021-12-29 PROCEDURE — 99214 OFFICE O/P EST MOD 30 MIN: CPT | Performed by: NURSE PRACTITIONER

## 2021-12-29 PROCEDURE — 83880 ASSAY OF NATRIURETIC PEPTIDE: CPT | Performed by: NURSE PRACTITIONER

## 2021-12-29 PROCEDURE — 80053 COMPREHEN METABOLIC PANEL: CPT | Performed by: NURSE PRACTITIONER

## 2021-12-29 PROCEDURE — 84436 ASSAY OF TOTAL THYROXINE: CPT | Performed by: NURSE PRACTITIONER

## 2021-12-29 RX ORDER — AZITHROMYCIN 250 MG/1
TABLET, FILM COATED ORAL
Qty: 6 TABLET | Refills: 0 | Status: SHIPPED | OUTPATIENT
Start: 2021-12-29 | End: 2022-01-04 | Stop reason: HOSPADM

## 2021-12-29 RX ORDER — AZITHROMYCIN 250 MG/1
TABLET, FILM COATED ORAL
Qty: 6 TABLET | Refills: 0 | Status: SHIPPED | OUTPATIENT
Start: 2021-12-29 | End: 2021-12-29

## 2021-12-29 RX ORDER — METHYLPREDNISOLONE 4 MG/1
TABLET ORAL
Qty: 21 TABLET | Refills: 0 | Status: SHIPPED | OUTPATIENT
Start: 2021-12-29 | End: 2021-12-29

## 2021-12-29 RX ORDER — METHYLPREDNISOLONE 4 MG/1
TABLET ORAL
Qty: 21 TABLET | Refills: 0 | Status: SHIPPED | OUTPATIENT
Start: 2021-12-29 | End: 2022-01-04 | Stop reason: HOSPADM

## 2021-12-29 NOTE — ASSESSMENT & PLAN NOTE
We will treat patient as COPD exacerbation, will send in medications.  Lab work is also been sent.  Advise to follow-up if symptoms worsen or persist.

## 2021-12-29 NOTE — PROGRESS NOTES
"Chief Complaint  Lower Extremity Issue (Feet swelling), Fatigue, URI, and Cough    Subjective         Sage Michael presents to [facility] for Concerns of feet swelling, fatigue/weakness, shortness of breath, cough, concerns for upper respiratory infection.    Patient is here today with his daughter, she is his primary caretaker as he does have Alzheimer's.  Daughter states that she noticed when bathing him that his feet were swollen.  She was concerned about this as she heard that it could be caused by heart failure.  She reports that patient has had upper respiratory infection type symptoms over the last few days, including cough, congestion.  She denies any fever or chills, but patient is not a good historian, he does not talk much.  He was seen approximately 2 months ago for regular follow-up, at the time he was doing well.  She does not have any other acute concerns or questions at this time.         Review of Systems   Constitutional: Positive for fatigue. Negative for chills and fever.   HENT: Positive for congestion. Negative for rhinorrhea and sneezing.    Respiratory: Positive for cough and shortness of breath. Negative for wheezing.    Cardiovascular: Positive for leg swelling.   Gastrointestinal: Negative for diarrhea and vomiting.   Skin: Negative for rash.       Objective   Vital Signs:   /70   Pulse 76   Temp 98 °F (36.7 °C) (Temporal)   Resp 12   Ht 165.1 cm (65\")   Wt 61.7 kg (136 lb)   SpO2 94%   BMI 22.63 kg/m²     Physical Exam  Vitals and nursing note reviewed.   Constitutional:       Appearance: Normal appearance. He is normal weight.   HENT:      Head: Normocephalic and atraumatic.      Right Ear: Tympanic membrane, ear canal and external ear normal.      Left Ear: Tympanic membrane, ear canal and external ear normal.      Nose: Nose normal.      Mouth/Throat:      Mouth: Mucous membranes are moist.      Pharynx: Oropharynx is clear.   Eyes:      " Conjunctiva/sclera: Conjunctivae normal.      Pupils: Pupils are equal, round, and reactive to light.   Cardiovascular:      Rate and Rhythm: Normal rate and regular rhythm.   Pulmonary:      Effort: Pulmonary effort is normal.      Breath sounds: Wheezing present.   Abdominal:      General: Abdomen is flat. Bowel sounds are normal.   Musculoskeletal:      Right lower le+ Pitting Edema present.      Left lower le+ Pitting Edema present.   Skin:     General: Skin is warm and dry.   Neurological:      Mental Status: He is alert.        Result Review :                XR Chest PA & Lateral    Result Date: 2021  Narrative: PROCEDURE: XR CHEST PA AND LATERAL  COMPARISON: [facility], CR, CHEST PA/AP & LAT 2V, 2018, 13:36.  INDICATIONS: couigh, SOA, weakness x 2 days  FINDINGS:  Two plain film images chest are compared previous study performed on 11/3/2018 today's study reveals heart mediastinum are normal.  There are bilateral diffuse increased markings consistent with chronic lung disease and bronchitis more prominent since previous study.  No evidence of pneumonia or pleural effusion or pneumothorax or mass right or left lungs.  Arthritis is seen in the right glenohumeral shoulder joint.  CONCLUSION:  1. Bilateral diffuse increased markings more prominent since previous study performed on 11/3/2018 consistent with chronic lung disease and bronchitis     IQRA SHERIFF MD       Electronically Signed and Approved By: IQRA SHERIFF MD on 2021 at 11:16                 Diagnoses and all orders for this visit:    1. COPD with exacerbation (HCC) (Primary)  Assessment & Plan:  We will treat patient as COPD exacerbation, will send in medications.  Lab work is also been sent.  Advise to follow-up if symptoms worsen or persist.        Orders:  -     proBNP  -     Magnesium  -     Comprehensive Metabolic Panel  -     CBC & Differential    2. Cough  -     XR Chest PA &  Lateral    3. Leg swelling  -     proBNP  -     Magnesium  -     Comprehensive Metabolic Panel  -     CBC & Differential    4. Weakness  -     proBNP  -     Magnesium  -     Comprehensive Metabolic Panel  -     CBC & Differential    5. Fatigue, unspecified type  -     proBNP  -     Magnesium  -     Comprehensive Metabolic Panel  -     CBC & Differential    6. Dyspnea on exertion   Assessment & Plan:  We will check labs and follow-up as needed.    Orders:  -     proBNP    Other orders  -     azithromycin (Zithromax Z-Endy) 250 MG tablet; Take 2 tablets by mouth Daily for 1 day, THEN 1 tablet Daily for 4 days.  Dispense: 6 tablet; Refill: 0  -     methylPREDNISolone (MEDROL) 4 MG dose pack; Take as directed on package instructions.  Dispense: 21 tablet; Refill: 0        Follow Up   Return if symptoms worsen or fail to improve.  Patient was given instructions and counseling regarding his condition or for health maintenance advice. Please see specific information pulled into the AVS if appropriate.     ULISSES Boyd  12/29/2021  This note was electronically signed.

## 2021-12-30 LAB — NT-PROBNP SERPL-MCNC: 2975 PG/ML (ref 0–1800)

## 2022-01-03 ENCOUNTER — APPOINTMENT (OUTPATIENT)
Dept: GENERAL RADIOLOGY | Facility: HOSPITAL | Age: 87
End: 2022-01-03

## 2022-01-03 ENCOUNTER — TELEPHONE (OUTPATIENT)
Dept: INTERNAL MEDICINE | Facility: CLINIC | Age: 87
End: 2022-01-03

## 2022-01-03 ENCOUNTER — HOSPITAL ENCOUNTER (INPATIENT)
Facility: HOSPITAL | Age: 87
LOS: 1 days | Discharge: HOME | End: 2022-01-04
Attending: EMERGENCY MEDICINE | Admitting: STUDENT IN AN ORGANIZED HEALTH CARE EDUCATION/TRAINING PROGRAM

## 2022-01-03 DIAGNOSIS — J96.01 ACUTE RESPIRATORY FAILURE WITH HYPOXIA: Primary | ICD-10-CM

## 2022-01-03 DIAGNOSIS — I50.9 CONGESTIVE HEART FAILURE, UNSPECIFIED HF CHRONICITY, UNSPECIFIED HEART FAILURE TYPE: ICD-10-CM

## 2022-01-03 DIAGNOSIS — J18.9 PNEUMONIA DUE TO INFECTIOUS ORGANISM, UNSPECIFIED LATERALITY, UNSPECIFIED PART OF LUNG: ICD-10-CM

## 2022-01-03 DIAGNOSIS — I48.91 ATRIAL FIBRILLATION, UNSPECIFIED TYPE: ICD-10-CM

## 2022-01-03 PROBLEM — I48.0 PAROXYSMAL ATRIAL FIBRILLATION (HCC): Status: ACTIVE | Noted: 2022-01-03

## 2022-01-03 PROBLEM — E11.65 TYPE 2 DIABETES MELLITUS WITH HYPERGLYCEMIA, WITHOUT LONG-TERM CURRENT USE OF INSULIN (HCC): Status: ACTIVE | Noted: 2017-07-25

## 2022-01-03 LAB
ALBUMIN SERPL-MCNC: 4 G/DL (ref 3.5–5.2)
ALBUMIN/GLOB SERPL: 1.3 G/DL
ALP SERPL-CCNC: 125 U/L (ref 39–117)
ALT SERPL W P-5'-P-CCNC: 65 U/L (ref 1–41)
ANION GAP SERPL CALCULATED.3IONS-SCNC: 10.2 MMOL/L (ref 5–15)
ARTERIAL PATENCY WRIST A: POSITIVE
AST SERPL-CCNC: 24 U/L (ref 1–40)
BASE EXCESS BLDA CALC-SCNC: 0.7 MMOL/L (ref -2–2)
BASOPHILS # BLD AUTO: 0.03 10*3/MM3 (ref 0–0.2)
BASOPHILS NFR BLD AUTO: 0.5 % (ref 0–1.5)
BDY SITE: ABNORMAL
BILIRUB SERPL-MCNC: 0.2 MG/DL (ref 0–1.2)
BUN SERPL-MCNC: 20 MG/DL (ref 8–23)
BUN/CREAT SERPL: 18 (ref 7–25)
CALCIUM SPEC-SCNC: 8.8 MG/DL (ref 8.6–10.5)
CHLORIDE SERPL-SCNC: 106 MMOL/L (ref 98–107)
CO2 SERPL-SCNC: 25.8 MMOL/L (ref 22–29)
COHGB MFR BLD: 0.7 % (ref 0–1.5)
CREAT SERPL-MCNC: 1.11 MG/DL (ref 0.76–1.27)
D-LACTATE SERPL-SCNC: 1.2 MMOL/L (ref 0.5–2)
DEPRECATED RDW RBC AUTO: 51.6 FL (ref 37–54)
EOSINOPHIL # BLD AUTO: 0.26 10*3/MM3 (ref 0–0.4)
EOSINOPHIL NFR BLD AUTO: 4.2 % (ref 0.3–6.2)
ERYTHROCYTE [DISTWIDTH] IN BLOOD BY AUTOMATED COUNT: 13.7 % (ref 12.3–15.4)
FHHB: 13.1 % (ref 0–5)
GFR SERPL CREATININE-BSD FRML MDRD: 62 ML/MIN/1.73
GLOBULIN UR ELPH-MCNC: 3.1 GM/DL
GLUCOSE SERPL-MCNC: 281 MG/DL (ref 65–99)
HCO3 BLDA-SCNC: 25.4 MMOL/L (ref 22–26)
HCT VFR BLD AUTO: 35.5 % (ref 37.5–51)
HGB BLD-MCNC: 11.6 G/DL (ref 13–17.7)
HGB BLDA-MCNC: 11.7 G/DL (ref 13.8–16.4)
HOLD SPECIMEN: NORMAL
HOLD SPECIMEN: NORMAL
IMM GRANULOCYTES # BLD AUTO: 0.03 10*3/MM3 (ref 0–0.05)
IMM GRANULOCYTES NFR BLD AUTO: 0.5 % (ref 0–0.5)
INHALED O2 CONCENTRATION: 21 %
LYMPHOCYTES # BLD AUTO: 1.38 10*3/MM3 (ref 0.7–3.1)
LYMPHOCYTES NFR BLD AUTO: 22.3 % (ref 19.6–45.3)
MCH RBC QN AUTO: 33.3 PG (ref 26.6–33)
MCHC RBC AUTO-ENTMCNC: 32.7 G/DL (ref 31.5–35.7)
MCV RBC AUTO: 102 FL (ref 79–97)
METHGB BLD QL: 0.1 % (ref 0–1.5)
MODALITY: ABNORMAL
MONOCYTES # BLD AUTO: 0.41 10*3/MM3 (ref 0.1–0.9)
MONOCYTES NFR BLD AUTO: 6.6 % (ref 5–12)
NEUTROPHILS NFR BLD AUTO: 4.09 10*3/MM3 (ref 1.7–7)
NEUTROPHILS NFR BLD AUTO: 65.9 % (ref 42.7–76)
NRBC BLD AUTO-RTO: 0 /100 WBC (ref 0–0.2)
NT-PROBNP SERPL-MCNC: 3000 PG/ML (ref 0–1800)
OXYHGB MFR BLDV: 86.1 % (ref 94–99)
PCO2 BLDA: 41 MM HG (ref 35–45)
PH BLDA: 7.41 PH UNITS (ref 7.35–7.45)
PLATELET # BLD AUTO: 195 10*3/MM3 (ref 140–450)
PMV BLD AUTO: 10.2 FL (ref 6–12)
PO2 BLD: 245 MM[HG] (ref 0–500)
PO2 BLDA: 51.5 MM HG (ref 80–100)
POTASSIUM SERPL-SCNC: 4.7 MMOL/L (ref 3.5–5.2)
PROT SERPL-MCNC: 7.1 G/DL (ref 6–8.5)
RBC # BLD AUTO: 3.48 10*6/MM3 (ref 4.14–5.8)
SAO2 % BLDCOA: 86.8 % (ref 95–99)
SARS-COV-2 RNA PNL SPEC NAA+PROBE: NOT DETECTED
SODIUM SERPL-SCNC: 142 MMOL/L (ref 136–145)
TROPONIN T SERPL-MCNC: 0.02 NG/ML (ref 0–0.03)
WBC NRBC COR # BLD: 6.2 10*3/MM3 (ref 3.4–10.8)
WHOLE BLOOD HOLD SPECIMEN: NORMAL
WHOLE BLOOD HOLD SPECIMEN: NORMAL

## 2022-01-03 PROCEDURE — U0005 INFEC AGEN DETEC AMPLI PROBE: HCPCS | Performed by: EMERGENCY MEDICINE

## 2022-01-03 PROCEDURE — 93005 ELECTROCARDIOGRAM TRACING: CPT | Performed by: EMERGENCY MEDICINE

## 2022-01-03 PROCEDURE — 36600 WITHDRAWAL OF ARTERIAL BLOOD: CPT | Performed by: EMERGENCY MEDICINE

## 2022-01-03 PROCEDURE — 71045 X-RAY EXAM CHEST 1 VIEW: CPT

## 2022-01-03 PROCEDURE — 25010000002 AZITHROMYCIN PER 500 MG: Performed by: EMERGENCY MEDICINE

## 2022-01-03 PROCEDURE — U0004 COV-19 TEST NON-CDC HGH THRU: HCPCS | Performed by: EMERGENCY MEDICINE

## 2022-01-03 PROCEDURE — 80053 COMPREHEN METABOLIC PANEL: CPT | Performed by: EMERGENCY MEDICINE

## 2022-01-03 PROCEDURE — 83735 ASSAY OF MAGNESIUM: CPT | Performed by: FAMILY MEDICINE

## 2022-01-03 PROCEDURE — 63710000001 DIPHENHYDRAMINE PER 50 MG: Performed by: FAMILY MEDICINE

## 2022-01-03 PROCEDURE — 36415 COLL VENOUS BLD VENIPUNCTURE: CPT | Performed by: EMERGENCY MEDICINE

## 2022-01-03 PROCEDURE — 82375 ASSAY CARBOXYHB QUANT: CPT | Performed by: EMERGENCY MEDICINE

## 2022-01-03 PROCEDURE — 87040 BLOOD CULTURE FOR BACTERIA: CPT | Performed by: EMERGENCY MEDICINE

## 2022-01-03 PROCEDURE — 85025 COMPLETE CBC W/AUTO DIFF WBC: CPT

## 2022-01-03 PROCEDURE — 83050 HGB METHEMOGLOBIN QUAN: CPT | Performed by: EMERGENCY MEDICINE

## 2022-01-03 PROCEDURE — 83880 ASSAY OF NATRIURETIC PEPTIDE: CPT | Performed by: EMERGENCY MEDICINE

## 2022-01-03 PROCEDURE — 85025 COMPLETE CBC W/AUTO DIFF WBC: CPT | Performed by: FAMILY MEDICINE

## 2022-01-03 PROCEDURE — 84443 ASSAY THYROID STIM HORMONE: CPT | Performed by: FAMILY MEDICINE

## 2022-01-03 PROCEDURE — 84484 ASSAY OF TROPONIN QUANT: CPT | Performed by: EMERGENCY MEDICINE

## 2022-01-03 PROCEDURE — 82805 BLOOD GASES W/O2 SATURATION: CPT | Performed by: EMERGENCY MEDICINE

## 2022-01-03 PROCEDURE — 99223 1ST HOSP IP/OBS HIGH 75: CPT | Performed by: FAMILY MEDICINE

## 2022-01-03 PROCEDURE — 25010000002 CEFTRIAXONE PER 250 MG: Performed by: EMERGENCY MEDICINE

## 2022-01-03 PROCEDURE — 99284 EMERGENCY DEPT VISIT MOD MDM: CPT

## 2022-01-03 PROCEDURE — 93005 ELECTROCARDIOGRAM TRACING: CPT

## 2022-01-03 PROCEDURE — 25010000002 FUROSEMIDE PER 20 MG: Performed by: EMERGENCY MEDICINE

## 2022-01-03 PROCEDURE — 80053 COMPREHEN METABOLIC PANEL: CPT | Performed by: FAMILY MEDICINE

## 2022-01-03 PROCEDURE — 83605 ASSAY OF LACTIC ACID: CPT | Performed by: EMERGENCY MEDICINE

## 2022-01-03 RX ORDER — CEFTRIAXONE SODIUM 1 G/50ML
1 INJECTION, SOLUTION INTRAVENOUS ONCE
Status: COMPLETED | OUTPATIENT
Start: 2022-01-03 | End: 2022-01-03

## 2022-01-03 RX ORDER — LISINOPRIL 2.5 MG/1
2.5 TABLET ORAL DAILY
Status: DISCONTINUED | OUTPATIENT
Start: 2022-01-04 | End: 2022-01-04 | Stop reason: HOSPADM

## 2022-01-03 RX ORDER — TRAZODONE HYDROCHLORIDE 50 MG/1
50 TABLET ORAL NIGHTLY
Status: DISCONTINUED | OUTPATIENT
Start: 2022-01-03 | End: 2022-01-04 | Stop reason: HOSPADM

## 2022-01-03 RX ORDER — MORPHINE SULFATE 2 MG/ML
2 INJECTION, SOLUTION INTRAMUSCULAR; INTRAVENOUS
Status: DISCONTINUED | OUTPATIENT
Start: 2022-01-03 | End: 2022-01-04 | Stop reason: HOSPADM

## 2022-01-03 RX ORDER — PRIMIDONE 50 MG/1
100 TABLET ORAL 3 TIMES DAILY
Status: DISCONTINUED | OUTPATIENT
Start: 2022-01-03 | End: 2022-01-04 | Stop reason: HOSPADM

## 2022-01-03 RX ORDER — ASPIRIN 81 MG/1
81 TABLET ORAL DAILY
COMMUNITY

## 2022-01-03 RX ORDER — CHOLECALCIFEROL (VITAMIN D3) 125 MCG
5 CAPSULE ORAL NIGHTLY PRN
Status: DISCONTINUED | OUTPATIENT
Start: 2022-01-03 | End: 2022-01-04 | Stop reason: HOSPADM

## 2022-01-03 RX ORDER — TAMSULOSIN HYDROCHLORIDE 0.4 MG/1
0.4 CAPSULE ORAL DAILY
Status: DISCONTINUED | OUTPATIENT
Start: 2022-01-04 | End: 2022-01-04 | Stop reason: HOSPADM

## 2022-01-03 RX ORDER — NALOXONE HCL 0.4 MG/ML
0.4 VIAL (ML) INJECTION
Status: DISCONTINUED | OUTPATIENT
Start: 2022-01-03 | End: 2022-01-04 | Stop reason: HOSPADM

## 2022-01-03 RX ORDER — AMOXICILLIN 250 MG
2 CAPSULE ORAL 2 TIMES DAILY
Status: DISCONTINUED | OUTPATIENT
Start: 2022-01-03 | End: 2022-01-04 | Stop reason: HOSPADM

## 2022-01-03 RX ORDER — SODIUM CHLORIDE 0.9 % (FLUSH) 0.9 %
10 SYRINGE (ML) INJECTION AS NEEDED
Status: DISCONTINUED | OUTPATIENT
Start: 2022-01-03 | End: 2022-01-04 | Stop reason: HOSPADM

## 2022-01-03 RX ORDER — POLYETHYLENE GLYCOL 3350 17 G/17G
17 POWDER, FOR SOLUTION ORAL DAILY PRN
Status: DISCONTINUED | OUTPATIENT
Start: 2022-01-03 | End: 2022-01-04 | Stop reason: HOSPADM

## 2022-01-03 RX ORDER — FUROSEMIDE 20 MG/1
20 TABLET ORAL ONCE
Status: DISCONTINUED | OUTPATIENT
Start: 2022-01-03 | End: 2022-01-03

## 2022-01-03 RX ORDER — ACETAMINOPHEN 160 MG/5ML
650 SOLUTION ORAL EVERY 4 HOURS PRN
Status: DISCONTINUED | OUTPATIENT
Start: 2022-01-03 | End: 2022-01-04 | Stop reason: HOSPADM

## 2022-01-03 RX ORDER — CALCIUM CARBONATE 200(500)MG
2 TABLET,CHEWABLE ORAL 2 TIMES DAILY PRN
Status: DISCONTINUED | OUTPATIENT
Start: 2022-01-03 | End: 2022-01-04 | Stop reason: HOSPADM

## 2022-01-03 RX ORDER — SODIUM CHLORIDE 0.9 % (FLUSH) 0.9 %
10 SYRINGE (ML) INJECTION EVERY 12 HOURS SCHEDULED
Status: DISCONTINUED | OUTPATIENT
Start: 2022-01-03 | End: 2022-01-04 | Stop reason: HOSPADM

## 2022-01-03 RX ORDER — AZITHROMYCIN 250 MG/1
500 TABLET, FILM COATED ORAL ONCE
Status: DISCONTINUED | OUTPATIENT
Start: 2022-01-03 | End: 2022-01-03

## 2022-01-03 RX ORDER — LEVOTHYROXINE SODIUM 0.05 MG/1
50 TABLET ORAL EVERY MORNING
Status: DISCONTINUED | OUTPATIENT
Start: 2022-01-04 | End: 2022-01-04 | Stop reason: HOSPADM

## 2022-01-03 RX ORDER — DIPHENHYDRAMINE HCL 25 MG
25 CAPSULE ORAL ONCE
Status: COMPLETED | OUTPATIENT
Start: 2022-01-03 | End: 2022-01-03

## 2022-01-03 RX ORDER — BISACODYL 10 MG
10 SUPPOSITORY, RECTAL RECTAL DAILY PRN
Status: DISCONTINUED | OUTPATIENT
Start: 2022-01-03 | End: 2022-01-04 | Stop reason: HOSPADM

## 2022-01-03 RX ORDER — ACETAMINOPHEN 325 MG/1
650 TABLET ORAL EVERY 4 HOURS PRN
Status: DISCONTINUED | OUTPATIENT
Start: 2022-01-03 | End: 2022-01-04 | Stop reason: HOSPADM

## 2022-01-03 RX ORDER — ACETAMINOPHEN 650 MG/1
650 SUPPOSITORY RECTAL EVERY 4 HOURS PRN
Status: DISCONTINUED | OUTPATIENT
Start: 2022-01-03 | End: 2022-01-04 | Stop reason: HOSPADM

## 2022-01-03 RX ORDER — ONDANSETRON 4 MG/1
4 TABLET, FILM COATED ORAL EVERY 6 HOURS PRN
Status: DISCONTINUED | OUTPATIENT
Start: 2022-01-03 | End: 2022-01-04 | Stop reason: HOSPADM

## 2022-01-03 RX ORDER — SODIUM CHLORIDE 0.9 % (FLUSH) 0.9 %
10 SYRINGE (ML) INJECTION AS NEEDED
Status: DISCONTINUED | OUTPATIENT
Start: 2022-01-03 | End: 2022-01-03

## 2022-01-03 RX ORDER — ASPIRIN 81 MG/1
81 TABLET ORAL DAILY
Status: DISCONTINUED | OUTPATIENT
Start: 2022-01-04 | End: 2022-01-04 | Stop reason: HOSPADM

## 2022-01-03 RX ORDER — BISACODYL 5 MG/1
5 TABLET, DELAYED RELEASE ORAL DAILY PRN
Status: DISCONTINUED | OUTPATIENT
Start: 2022-01-03 | End: 2022-01-04 | Stop reason: HOSPADM

## 2022-01-03 RX ORDER — ONDANSETRON 2 MG/ML
4 INJECTION INTRAMUSCULAR; INTRAVENOUS EVERY 6 HOURS PRN
Status: DISCONTINUED | OUTPATIENT
Start: 2022-01-03 | End: 2022-01-04 | Stop reason: HOSPADM

## 2022-01-03 RX ORDER — FUROSEMIDE 10 MG/ML
40 INJECTION INTRAMUSCULAR; INTRAVENOUS ONCE
Status: COMPLETED | OUTPATIENT
Start: 2022-01-03 | End: 2022-01-03

## 2022-01-03 RX ORDER — NICOTINE POLACRILEX 4 MG
15 LOZENGE BUCCAL
Status: DISCONTINUED | OUTPATIENT
Start: 2022-01-03 | End: 2022-01-04 | Stop reason: HOSPADM

## 2022-01-03 RX ORDER — ACETAMINOPHEN 325 MG/1
650 TABLET ORAL EVERY 6 HOURS PRN
Status: DISCONTINUED | OUTPATIENT
Start: 2022-01-03 | End: 2022-01-04 | Stop reason: HOSPADM

## 2022-01-03 RX ORDER — DEXTROSE MONOHYDRATE 100 MG/ML
25 INJECTION, SOLUTION INTRAVENOUS
Status: DISCONTINUED | OUTPATIENT
Start: 2022-01-03 | End: 2022-01-04 | Stop reason: HOSPADM

## 2022-01-03 RX ORDER — ATORVASTATIN CALCIUM 20 MG/1
20 TABLET, FILM COATED ORAL DAILY
Status: DISCONTINUED | OUTPATIENT
Start: 2022-01-04 | End: 2022-01-04 | Stop reason: HOSPADM

## 2022-01-03 RX ADMIN — SENNOSIDES AND DOCUSATE SODIUM 2 TABLET: 50; 8.6 TABLET ORAL at 23:18

## 2022-01-03 RX ADMIN — DIPHENHYDRAMINE HYDROCHLORIDE 25 MG: 25 CAPSULE ORAL at 23:18

## 2022-01-03 RX ADMIN — AZITHROMYCIN DIHYDRATE 500 MG: 500 INJECTION, POWDER, LYOPHILIZED, FOR SOLUTION INTRAVENOUS at 21:04

## 2022-01-03 RX ADMIN — Medication 5 MG: at 23:17

## 2022-01-03 RX ADMIN — CEFTRIAXONE SODIUM 1 G: 1 INJECTION, SOLUTION INTRAVENOUS at 19:07

## 2022-01-03 RX ADMIN — FUROSEMIDE 40 MG: 10 INJECTION, SOLUTION INTRAMUSCULAR; INTRAVENOUS at 19:07

## 2022-01-03 RX ADMIN — TRAZODONE HYDROCHLORIDE 50 MG: 50 TABLET ORAL at 23:17

## 2022-01-03 RX ADMIN — PRIMIDONE 100 MG: 50 TABLET ORAL at 23:17

## 2022-01-03 NOTE — ED PROVIDER NOTES
Time: 18:56 EST  Arrived by: POJOEY  Chief Complaint: SOB  History provided by: pt  History is limited by: N/A    History of Present Illness:    Sage Michael is a 89 y.o. male who presents to the emergency department today with complaints of shortness of breath over the past week. Pt was seen last week for symptoms where he was started on antibiotics as well as steroids. Despite the medication pt has continued to experience the shortness of breath. Daughter claims his home pulse oximeter read 87% prior to arrival. Daughter at bedside goes on to complain of a cough as well as bilateral leg edema. Hx/ros given by daughter. Hx limited due to age.       History provided by:  Patient and relative   used: No         Past Medical History:     Allergies   Allergen Reactions   • Iodine Unknown - Low Severity     Past Medical History:   Diagnosis Date   • Arthritis    • Broken bones    • Cataract    • Colon abnormality    • Controlled type 1 diabetes mellitus with diabetic neuropathy ([facility]) 07/25/2017   • Deafness    • Dementia ([facility])    • Diabetes mellitus type 2, noninsulin dependent ([facility])    • Diabetic neuropathy ([facility]) 05/12/2016   • Diverticula of colon    • Foot pain, bilateral 01/23/2018   • Forgetfulness    • GERD (gastroesophageal reflux disease)    • Hemorrhoid    • Hyperlipidemia 05/12/2016   • Hypertension    • Ingrowing nail 06/27/2018   • Ingrowing toenail 09/24/2018   • Lumbago    • Lumbar compression fracture ([facility]) 05/20/2021    MULITPLE AGE-INDETERMINATE   • Neurologic disorder    • Neuropathy    • Parkinson disease ([facility]) 05/12/2016   • Peripheral vascular disease ([facility])    • Polyneuropathy 07/25/2017   • Tinea unguium 07/25/2017     Past Surgical History:   Procedure Laterality Date   • AORTA - FEMORAL ARTERY BYPASS GRAFT     • CAROTID STENT      CAROTID ARTERY STENTING   • CATARACT EXTRACTION       Family History   Problem Relation Age of Onset   • Arthritis Mother    • Arthritis Father     • Arthritis Sister    • Stroke Sister    • Arthritis Brother    • Arthritis Daughter        Home Medications:  Prior to Admission medications    Medication Sig Start Date End Date Taking? Authorizing Provider   azithromycin (Zithromax Z-Endy) 250 MG tablet Take 2 tablets by mouth Daily for 1 day, THEN 1 tablet Daily for 4 days. 12/29/21 1/3/22  Meliton Ozuna APRN   denosumab (Prolia) 60 MG/ML solution prefilled syringe syringe Inject 60 mg under the skin into the appropriate area as directed Every 6 (Six) Months.    Provider, MD David   donepezil (ARICEPT) 5 MG tablet Take 1 tablet by mouth Every Night for 180 days. 10/28/21 4/26/22  Laura Kemp APRN   glucose blood (FREESTYLE LITE) test strip 1 each by Other route As Needed (use as directed). Use as instructed 10/28/21   Laura Kemp APRN   Isopropyl Alcohol (ALCOHOL WIPES EX) Apply  topically.    Provider, MD Dvaid   levothyroxine (Synthroid) 50 MCG tablet Take 1 tablet by mouth Daily for 180 days. 10/28/21 4/26/22  Laura Kemp APRN   lisinopril (PRINIVIL,ZESTRIL) 2.5 MG tablet Take 1 tablet by mouth Daily for 180 days. 10/28/21 4/26/22  Laura Kemp APRN   memantine (NAMENDA) 10 MG tablet Take 1 tablet by mouth 2 (Two) Times a Day for 90 days. 10/28/21 1/26/22  Laura Kemp APRN   methylPREDNISolone (MEDROL) 4 MG dose pack Take as directed on package instructions. 12/29/21   Meliton Ozuna APRN   primidone (MYSOLINE) 50 MG tablet Take 2 tablets by mouth 3 (Three) Times a Day for 180 days. 10/28/21 4/26/22  Laura Kemp APRN   simvastatin (ZOCOR) 40 MG tablet Take 1 tablet by mouth Every Night for 180 days. 10/28/21 4/26/22  Laura Kemp APRN   SITagliptin (JANUVIA) 50 MG tablet Take 1 tablet by mouth Daily for 90 days. 10/28/21 1/26/22  Justo, Laura M, APRN   tamsulosin (FLOMAX) 0.4 MG capsule 24 hr capsule Take 1 capsule by mouth Daily. 10/28/21   Laura Kemp, APRN        Social History:   PT  reports that he quit smoking  "about 3 years ago. He has a 75.00 pack-year smoking history. He quit smokeless tobacco use about 3 years ago.  His smokeless tobacco use included chew. He reports previous alcohol use. He reports that he does not use drugs.    Record Review:  I have reviewed the patient's records in [facility].     Review of Systems  Review of Systems   Unable to perform ROS: Age   Respiratory: Positive for cough and shortness of breath.         Physical Exam  /55   Pulse 68   Temp 97.6 °F (36.4 °C) (Oral)   Resp 18   Ht 165.1 cm (65\")   Wt 67 kg (147 lb 11.3 oz)   SpO2 (!) 86%   BMI 24.58 kg/m²     Physical Exam  Vitals and nursing note reviewed.   Constitutional:       General: He is not in acute distress.     Appearance: Normal appearance. He is not toxic-appearing.   HENT:      Head: Normocephalic and atraumatic.      Jaw: There is normal jaw occlusion.   Eyes:      General: Lids are normal.      Extraocular Movements: Extraocular movements intact.      Conjunctiva/sclera: Conjunctivae normal.      Pupils: Pupils are equal, round, and reactive to light.   Cardiovascular:      Rate and Rhythm: Normal rate and regular rhythm.      Pulses: Normal pulses.      Heart sounds: Normal heart sounds.   Pulmonary:      Effort: Pulmonary effort is normal. No respiratory distress.      Breath sounds: Decreased breath sounds present. No wheezing or rhonchi.   Abdominal:      General: Abdomen is flat.      Palpations: Abdomen is soft.      Tenderness: There is no abdominal tenderness. There is no guarding or rebound.   Musculoskeletal:         General: Normal range of motion.      Cervical back: Normal range of motion and neck supple.      Right lower le+ Edema present.      Left lower le+ Edema present.   Skin:     General: Skin is warm and dry.   Neurological:      Mental Status: He is alert and oriented to person, place, and time. Mental status is at baseline.   Psychiatric:         Mood and Affect: Mood normal.            " "      ED Course  /55   Pulse 68   Temp 97.6 °F (36.4 °C) (Oral)   Resp 18   Ht 165.1 cm (65\")   Wt 67 kg (147 lb 11.3 oz)   SpO2 (!) 86%   BMI 24.58 kg/m²   Results for orders placed or performed during the hospital encounter of 01/03/22   Comprehensive Metabolic Panel    Specimen: Blood   Result Value Ref Range    Glucose 281 (H) 65 - 99 mg/dL    BUN 20 8 - 23 mg/dL    Creatinine 1.11 0.76 - 1.27 mg/dL    Sodium 142 136 - 145 mmol/L    Potassium 4.7 3.5 - 5.2 mmol/L    Chloride 106 98 - 107 mmol/L    CO2 25.8 22.0 - 29.0 mmol/L    Calcium 8.8 8.6 - 10.5 mg/dL    Total Protein 7.1 6.0 - 8.5 g/dL    Albumin 4.00 3.50 - 5.20 g/dL    ALT (SGPT) 65 (H) 1 - 41 U/L    AST (SGOT) 24 1 - 40 U/L    Alkaline Phosphatase 125 (H) 39 - 117 U/L    Total Bilirubin 0.2 0.0 - 1.2 mg/dL    eGFR Non African Amer 62 >60 mL/min/1.73    Globulin 3.1 gm/dL    A/G Ratio 1.3 g/dL    BUN/Creatinine Ratio 18.0 7.0 - 25.0    Anion Gap 10.2 5.0 - 15.0 mmol/L   BNP    Specimen: Blood   Result Value Ref Range    proBNP 3,000.0 (H) 0.0-1,800.0 pg/mL   Troponin    Specimen: Blood   Result Value Ref Range    Troponin T 0.024 0.000 - 0.030 ng/mL   CBC Auto Differential    Specimen: Blood   Result Value Ref Range    WBC 6.20 3.40 - 10.80 10*3/mm3    RBC 3.48 (L) 4.14 - 5.80 10*6/mm3    Hemoglobin 11.6 (L) 13.0 - 17.7 g/dL    Hematocrit 35.5 (L) 37.5 - 51.0 %    .0 (H) 79.0 - 97.0 fL    MCH 33.3 (H) 26.6 - 33.0 pg    MCHC 32.7 31.5 - 35.7 g/dL    RDW 13.7 12.3 - 15.4 %    RDW-SD 51.6 37.0 - 54.0 fl    MPV 10.2 6.0 - 12.0 fL    Platelets 195 140 - 450 10*3/mm3    Neutrophil % 65.9 42.7 - 76.0 %    Lymphocyte % 22.3 19.6 - 45.3 %    Monocyte % 6.6 5.0 - 12.0 %    Eosinophil % 4.2 0.3 - 6.2 %    Basophil % 0.5 0.0 - 1.5 %    Immature Grans % 0.5 0.0 - 0.5 %    Neutrophils, Absolute 4.09 1.70 - 7.00 10*3/mm3    Lymphocytes, Absolute 1.38 0.70 - 3.10 10*3/mm3    Monocytes, Absolute 0.41 0.10 - 0.90 10*3/mm3    Eosinophils, Absolute 0.26 " 0.00 - 0.40 10*3/mm3    Basophils, Absolute 0.03 0.00 - 0.20 10*3/mm3    Immature Grans, Absolute 0.03 0.00 - 0.05 10*3/mm3    nRBC 0.0 0.0 - 0.2 /100 WBC   Blood Gas, Arterial -With Co-Ox Panel: Yes    Specimen: Arterial Blood   Result Value Ref Range    pH, Arterial 7.410 7.350 - 7.450 pH units    pCO2, Arterial 41.0 35.0 - 45.0 mm Hg    pO2, Arterial 51.5 (L) 80.0 - 100.0 mm Hg    HCO3, Arterial 25.4 22.0 - 26.0 mmol/L    Base Excess, Arterial 0.7 -2.0 - 2.0 mmol/L    O2 Saturation, Arterial 86.8 (L) 95.0 - 99.0 %    Hemoglobin, Blood Gas 11.7 (L) 13.8 - 16.4 g/dL    Carboxyhemoglobin 0.7 0 - 1.5 %    Methemoglobin 0.10 0.00 - 1.50 %    Oxyhemoglobin 86.1 (L) 94 - 99 %    FHHB 13.1 (H) 0.0 - 5.0 %    Site Arterial: left brachial     Modality Room Air     FIO2 21 %    PO2/FIO2 245 0 - 500    Niraj's Test Positive    ECG 12 Lead   Result Value Ref Range    QT Interval 381 ms   Green Top (Gel)   Result Value Ref Range    Extra Tube Hold for add-ons.    Lavender Top   Result Value Ref Range    Extra Tube hold for add-on    Gold Top - SST   Result Value Ref Range    Extra Tube Hold for add-ons.    Light Blue Top   Result Value Ref Range    Extra Tube hold for add-on      Medications   sodium chloride 0.9 % flush 10 mL (has no administration in time range)   cefTRIAXone (ROCEPHIN) IVPB 1 g (has no administration in time range)   AZITHROMYCIN 500 MG/250 ML 0.9% NS IVPB (vial-mate) (has no administration in time range)   furosemide (LASIX) injection 40 mg (has no administration in time range)     XR Chest 1 View    Result Date: 1/3/2022  Narrative: PROCEDURE: XR CHEST 1 VW  COMPARISON: [facility], CR, CHEST PA/AP & LAT 2V, 11/03/2018, 13:36.  [facility], CR, XR CHEST PA AND LATERAL, 12/29/2021, 10:50.  INDICATIONS: SOA Triage Protocol  FINDINGS:  The heart is not definitely enlarged.  There remains prominence of markings in the lower lungs that could be due to multi focal  inflammatory infectious process.  Some chronic interstitial lung disease not excluded.  There are prominent shadows in the upper lobes that look like they relate to the 1st ribs and has been noted.  There are no pleural effusions.  There are degenerative changes involving what is visualized of the right shoulder.  CONCLUSION:  1. Prominence of markings within the lower lungs which has been suggested and could be reflective of an inflammatory infectious process.  Some chronic interstitial lung disease could not be excluded. 2. Moderate degenerative change right shoulder.       LINNETTE GONZALEZ MD       Electronically Signed and Approved By: LINNETTE GONZALEZ MD on 1/03/2022 at 13:31             XR Chest PA & Lateral    Result Date: 12/29/2021  Narrative: PROCEDURE: XR CHEST PA AND LATERAL  COMPARISON: [facility], CR, CHEST PA/AP & LAT 2V, 11/03/2018, 13:36.  INDICATIONS: couigh, SOA, weakness x 2 days  FINDINGS:  Two plain film images chest are compared previous study performed on 11/3/2018 today's study reveals heart mediastinum are normal.  There are bilateral diffuse increased markings consistent with chronic lung disease and bronchitis more prominent since previous study.  No evidence of pneumonia or pleural effusion or pneumothorax or mass right or left lungs.  Arthritis is seen in the right glenohumeral shoulder joint.  CONCLUSION:  1. Bilateral diffuse increased markings more prominent since previous study performed on 11/3/2018 consistent with chronic lung disease and bronchitis     IQRA SHERIFF MD       Electronically Signed and Approved By: IQAR SHERIFF MD on 12/29/2021 at 11:16               Procedures/EKGs:  Procedures  EKG performed at 1231 was observed by me to show atrial flutter with a ventricular rate of 85 beats minute.  There is also frequent unifocal PVCs.  QRS interval is normal.  Axis is normal 54 degrees.  There are some nonspecific ST-T wave change identified.  QT  "corrected was 429 ms.    Medical Decision Making:                     The patient was seen evaluated here in the ED by me.  The above history and physical examination was performed as documented.  Diagnostic data was obtained.  Results reviewed.  Discussed with the patient.  Patient had an unremarkable ED course with the only exception had a brief nonsustained run of A. fib with RVR that corrected spontaneously.  When talking to the daughter about him having a history of A. fib or any irregular heartbeat she reported that \"I think he has\".  Subsequently was not overly concerned about the A. fib so she was being controlled.  Patient was going to be set up for discharge home with outpatient treatment follow-up but was found out that his oxygen levels were on 3 L not room air as documented in the vital sign tab.  We took him off his O2 he desatted to 84% on room air.  Subsequently patient will require hospitalization due to his hypoxia.  Patient be treated for pneumonia but muscular treatment for congestive heart failure due to his elevated BNP.  Hospital service will be consulted.  The patient's daughter is aware the prescription plan agreeable to this.    MDM     Final diagnoses:   Acute respiratory failure with hypoxia (HCC)   Pneumonia due to infectious organism, unspecified laterality, unspecified part of lung   Congestive heart failure, unspecified HF chronicity, unspecified heart failure type (HCC)   Atrial fibrillation, unspecified type (HCC)          Disposition:  ED Disposition     ED Disposition Condition Comment    Decision to Admit            Documentation assistance provided by Junior Gonzales DO acting as scribe for Junior Gonzales DO. Information recorded by the scribe was done at my direction and has been verified and validated by me.        Monika Arvizu  01/03/22 [id number]       Junior Gonzales DO  01/03/22 [id number]    "

## 2022-01-03 NOTE — TELEPHONE ENCOUNTER
Red rule verified and correct.    Called daughter back.    SpO2 was 87 - 90 % at rest.    Referred to ED.

## 2022-01-03 NOTE — TELEPHONE ENCOUNTER
Caller: SEJAL MESA    Relationship: Emergency Contact    Best call back number: [phone]    Who are you requesting to speak with (clinical staff, provider,  specific staff member): CLINICAL STAFF    Do you require a callback: DAUGHTER REQUESTS CALLBACK TO DISCUSS LAB RESULTS FOR PATIENT THAT WAS DRAWN ON 12/29/21.  DAUGHTER REPORTS PATIENT'S FEET ARE STILL SWOLLEN, AND UPON WALKING IS HAVING LABORED BREATHING.

## 2022-01-03 NOTE — TELEPHONE ENCOUNTER
Red rule verified and correct.    Daughter calling stating pt is no better but no worse than he was at LOV - 12/29/2021    Visit Diagnoses       COPD with exacerbation (HCC)     Cough     Leg swelling     Weakness     Fatigue, unspecified type     Dyspnea on exertion      Hypothyroidism, unspecified type    Still taking prednisone and z-cornelio  SOB with exertion  Chest rattley but no production as he is unable to get it up.  He has been watching his sodium intake  They do not have a SpO2 garima.

## 2022-01-03 NOTE — ED NOTES
Patient sats dropped to 83% on room air. Once O2 was placed back on patient, oxygen levels came up to 93%     John Fuentes RN  01/03/22 [id number]

## 2022-01-03 NOTE — TELEPHONE ENCOUNTER
PT'S DAUGHTER CALLED BACK TO CHECK ON THIS STATUS AND TO REPORT HIS O2 LEVEL IS AT 87%.  PLEASE ADVISE, THANK YOU.

## 2022-01-04 ENCOUNTER — READMISSION MANAGEMENT (OUTPATIENT)
Dept: CALL CENTER | Facility: HOSPITAL | Age: 87
End: 2022-01-04

## 2022-01-04 VITALS
SYSTOLIC BLOOD PRESSURE: 177 MMHG | WEIGHT: 147.71 LBS | HEART RATE: 75 BPM | HEIGHT: 65 IN | TEMPERATURE: 97.5 F | OXYGEN SATURATION: 97 % | DIASTOLIC BLOOD PRESSURE: 85 MMHG | BODY MASS INDEX: 24.61 KG/M2 | RESPIRATION RATE: 16 BRPM

## 2022-01-04 LAB
ALBUMIN SERPL-MCNC: 3.5 G/DL (ref 3.5–5.2)
ALBUMIN SERPL-MCNC: 4.4 G/DL (ref 3.5–5.2)
ALBUMIN/GLOB SERPL: 0.9 G/DL
ALBUMIN/GLOB SERPL: 1.3 G/DL
ALP SERPL-CCNC: 126 U/L (ref 39–117)
ALP SERPL-CCNC: 135 U/L (ref 39–117)
ALT SERPL W P-5'-P-CCNC: 61 U/L (ref 1–41)
ALT SERPL W P-5'-P-CCNC: 72 U/L (ref 1–41)
ANION GAP SERPL CALCULATED.3IONS-SCNC: 12.3 MMOL/L (ref 5–15)
ANION GAP SERPL CALCULATED.3IONS-SCNC: 13.7 MMOL/L (ref 5–15)
ANISOCYTOSIS BLD QL: NORMAL
AST SERPL-CCNC: 28 U/L (ref 1–40)
AST SERPL-CCNC: 30 U/L (ref 1–40)
BASOPHILS # BLD AUTO: 0.04 10*3/MM3 (ref 0–0.2)
BASOPHILS # BLD AUTO: 0.05 10*3/MM3 (ref 0–0.2)
BASOPHILS NFR BLD AUTO: 0.5 % (ref 0–1.5)
BASOPHILS NFR BLD AUTO: 0.5 % (ref 0–1.5)
BILIRUB SERPL-MCNC: 0.3 MG/DL (ref 0–1.2)
BILIRUB SERPL-MCNC: 0.3 MG/DL (ref 0–1.2)
BUN SERPL-MCNC: 17 MG/DL (ref 8–23)
BUN SERPL-MCNC: 18 MG/DL (ref 8–23)
BUN/CREAT SERPL: 15.9 (ref 7–25)
BUN/CREAT SERPL: 16.8 (ref 7–25)
CALCIUM SPEC-SCNC: 8.9 MG/DL (ref 8.6–10.5)
CALCIUM SPEC-SCNC: 9.3 MG/DL (ref 8.6–10.5)
CHLORIDE SERPL-SCNC: 105 MMOL/L (ref 98–107)
CHLORIDE SERPL-SCNC: 107 MMOL/L (ref 98–107)
CO2 SERPL-SCNC: 19.7 MMOL/L (ref 22–29)
CO2 SERPL-SCNC: 27.3 MMOL/L (ref 22–29)
CREAT SERPL-MCNC: 1.01 MG/DL (ref 0.76–1.27)
CREAT SERPL-MCNC: 1.13 MG/DL (ref 0.76–1.27)
DEPRECATED RDW RBC AUTO: 51.7 FL (ref 37–54)
DEPRECATED RDW RBC AUTO: 53.5 FL (ref 37–54)
EOSINOPHIL # BLD AUTO: 0.44 10*3/MM3 (ref 0–0.4)
EOSINOPHIL # BLD AUTO: 0.49 10*3/MM3 (ref 0–0.4)
EOSINOPHIL NFR BLD AUTO: 4.6 % (ref 0.3–6.2)
EOSINOPHIL NFR BLD AUTO: 5.5 % (ref 0.3–6.2)
ERYTHROCYTE [DISTWIDTH] IN BLOOD BY AUTOMATED COUNT: 13.7 % (ref 12.3–15.4)
ERYTHROCYTE [DISTWIDTH] IN BLOOD BY AUTOMATED COUNT: 13.8 % (ref 12.3–15.4)
GFR SERPL CREATININE-BSD FRML MDRD: 61 ML/MIN/1.73
GFR SERPL CREATININE-BSD FRML MDRD: 70 ML/MIN/1.73
GLOBULIN UR ELPH-MCNC: 3.4 GM/DL
GLOBULIN UR ELPH-MCNC: 3.7 GM/DL
GLUCOSE BLDC GLUCOMTR-MCNC: 177 MG/DL (ref 70–99)
GLUCOSE BLDC GLUCOMTR-MCNC: 222 MG/DL (ref 70–99)
GLUCOSE SERPL-MCNC: 196 MG/DL (ref 65–99)
GLUCOSE SERPL-MCNC: 199 MG/DL (ref 65–99)
HCT VFR BLD AUTO: 40 % (ref 37.5–51)
HCT VFR BLD AUTO: 42 % (ref 37.5–51)
HGB BLD-MCNC: 12.8 G/DL (ref 13–17.7)
HGB BLD-MCNC: 13.3 G/DL (ref 13–17.7)
IMM GRANULOCYTES # BLD AUTO: 0.03 10*3/MM3 (ref 0–0.05)
IMM GRANULOCYTES # BLD AUTO: 0.04 10*3/MM3 (ref 0–0.05)
IMM GRANULOCYTES NFR BLD AUTO: 0.4 % (ref 0–0.5)
IMM GRANULOCYTES NFR BLD AUTO: 0.4 % (ref 0–0.5)
LARGE PLATELETS: NORMAL
LYMPHOCYTES # BLD AUTO: 1.48 10*3/MM3 (ref 0.7–3.1)
LYMPHOCYTES # BLD AUTO: 1.81 10*3/MM3 (ref 0.7–3.1)
LYMPHOCYTES NFR BLD AUTO: 16.9 % (ref 19.6–45.3)
LYMPHOCYTES NFR BLD AUTO: 18.5 % (ref 19.6–45.3)
MACROCYTES BLD QL SMEAR: NORMAL
MAGNESIUM SERPL-MCNC: 2.1 MG/DL (ref 1.6–2.4)
MAGNESIUM SERPL-MCNC: 2.1 MG/DL (ref 1.6–2.4)
MCH RBC QN AUTO: 32.7 PG (ref 26.6–33)
MCH RBC QN AUTO: 33.3 PG (ref 26.6–33)
MCHC RBC AUTO-ENTMCNC: 31.7 G/DL (ref 31.5–35.7)
MCHC RBC AUTO-ENTMCNC: 32 G/DL (ref 31.5–35.7)
MCV RBC AUTO: 102 FL (ref 79–97)
MCV RBC AUTO: 105.3 FL (ref 79–97)
MONOCYTES # BLD AUTO: 0.7 10*3/MM3 (ref 0.1–0.9)
MONOCYTES # BLD AUTO: 0.77 10*3/MM3 (ref 0.1–0.9)
MONOCYTES NFR BLD AUTO: 7.2 % (ref 5–12)
MONOCYTES NFR BLD AUTO: 8.7 % (ref 5–12)
NEUTROPHILS NFR BLD AUTO: 5.33 10*3/MM3 (ref 1.7–7)
NEUTROPHILS NFR BLD AUTO: 66.4 % (ref 42.7–76)
NEUTROPHILS NFR BLD AUTO: 7.58 10*3/MM3 (ref 1.7–7)
NEUTROPHILS NFR BLD AUTO: 70.4 % (ref 42.7–76)
NRBC BLD AUTO-RTO: 0 /100 WBC (ref 0–0.2)
NRBC BLD AUTO-RTO: 0 /100 WBC (ref 0–0.2)
PHOSPHATE SERPL-MCNC: 2.6 MG/DL (ref 2.5–4.5)
PLATELET # BLD AUTO: 159 10*3/MM3 (ref 140–450)
PLATELET # BLD AUTO: 207 10*3/MM3 (ref 140–450)
PMV BLD AUTO: 10.8 FL (ref 6–12)
PMV BLD AUTO: 11 FL (ref 6–12)
POTASSIUM SERPL-SCNC: 4.3 MMOL/L (ref 3.5–5.2)
POTASSIUM SERPL-SCNC: 4.8 MMOL/L (ref 3.5–5.2)
PROCALCITONIN SERPL-MCNC: 0.05 NG/ML (ref 0–0.25)
PROT SERPL-MCNC: 7.2 G/DL (ref 6–8.5)
PROT SERPL-MCNC: 7.8 G/DL (ref 6–8.5)
QT INTERVAL: 381 MS
RBC # BLD AUTO: 3.92 10*6/MM3 (ref 4.14–5.8)
RBC # BLD AUTO: 3.99 10*6/MM3 (ref 4.14–5.8)
SMALL PLATELETS BLD QL SMEAR: ADEQUATE
SODIUM SERPL-SCNC: 139 MMOL/L (ref 136–145)
SODIUM SERPL-SCNC: 146 MMOL/L (ref 136–145)
TSH SERPL DL<=0.05 MIU/L-ACNC: 2.09 UIU/ML (ref 0.27–4.2)
WBC MORPH BLD: NORMAL
WBC NRBC COR # BLD: 10.74 10*3/MM3 (ref 3.4–10.8)
WBC NRBC COR # BLD: 8.02 10*3/MM3 (ref 3.4–10.8)

## 2022-01-04 PROCEDURE — 80053 COMPREHEN METABOLIC PANEL: CPT | Performed by: STUDENT IN AN ORGANIZED HEALTH CARE EDUCATION/TRAINING PROGRAM

## 2022-01-04 PROCEDURE — 84145 PROCALCITONIN (PCT): CPT | Performed by: STUDENT IN AN ORGANIZED HEALTH CARE EDUCATION/TRAINING PROGRAM

## 2022-01-04 PROCEDURE — 85007 BL SMEAR W/DIFF WBC COUNT: CPT | Performed by: STUDENT IN AN ORGANIZED HEALTH CARE EDUCATION/TRAINING PROGRAM

## 2022-01-04 PROCEDURE — 94618 PULMONARY STRESS TESTING: CPT

## 2022-01-04 PROCEDURE — 83735 ASSAY OF MAGNESIUM: CPT | Performed by: STUDENT IN AN ORGANIZED HEALTH CARE EDUCATION/TRAINING PROGRAM

## 2022-01-04 PROCEDURE — 94799 UNLISTED PULMONARY SVC/PX: CPT

## 2022-01-04 PROCEDURE — 85025 COMPLETE CBC W/AUTO DIFF WBC: CPT | Performed by: STUDENT IN AN ORGANIZED HEALTH CARE EDUCATION/TRAINING PROGRAM

## 2022-01-04 PROCEDURE — 94761 N-INVAS EAR/PLS OXIMETRY MLT: CPT

## 2022-01-04 PROCEDURE — 84100 ASSAY OF PHOSPHORUS: CPT | Performed by: STUDENT IN AN ORGANIZED HEALTH CARE EDUCATION/TRAINING PROGRAM

## 2022-01-04 PROCEDURE — 25010000002 ENOXAPARIN PER 10 MG: Performed by: FAMILY MEDICINE

## 2022-01-04 PROCEDURE — 99239 HOSP IP/OBS DSCHRG MGMT >30: CPT | Performed by: STUDENT IN AN ORGANIZED HEALTH CARE EDUCATION/TRAINING PROGRAM

## 2022-01-04 PROCEDURE — 63710000001 INSULIN LISPRO (HUMAN) PER 5 UNITS: Performed by: FAMILY MEDICINE

## 2022-01-04 PROCEDURE — 94760 N-INVAS EAR/PLS OXIMETRY 1: CPT

## 2022-01-04 PROCEDURE — 82962 GLUCOSE BLOOD TEST: CPT

## 2022-01-04 PROCEDURE — 25010000002 MORPHINE PER 10 MG: Performed by: FAMILY MEDICINE

## 2022-01-04 RX ORDER — DONEPEZIL HYDROCHLORIDE 5 MG/1
5 TABLET, FILM COATED ORAL NIGHTLY
Status: DISCONTINUED | OUTPATIENT
Start: 2022-01-04 | End: 2022-01-04 | Stop reason: HOSPADM

## 2022-01-04 RX ORDER — MEMANTINE HYDROCHLORIDE 10 MG/1
10 TABLET ORAL 2 TIMES DAILY
Status: DISCONTINUED | OUTPATIENT
Start: 2022-01-04 | End: 2022-01-04 | Stop reason: HOSPADM

## 2022-01-04 RX ORDER — TRAZODONE HYDROCHLORIDE 50 MG/1
50 TABLET ORAL NIGHTLY PRN
Qty: 30 TABLET | Refills: 0 | Status: SHIPPED | OUTPATIENT
Start: 2022-01-04 | End: 2022-02-03

## 2022-01-04 RX ADMIN — INSULIN LISPRO 2 UNITS: 100 INJECTION, SOLUTION INTRAVENOUS; SUBCUTANEOUS at 08:39

## 2022-01-04 RX ADMIN — ENOXAPARIN SODIUM 40 MG: 40 INJECTION SUBCUTANEOUS at 08:39

## 2022-01-04 RX ADMIN — ASPIRIN 81 MG: 81 TABLET, COATED ORAL at 08:39

## 2022-01-04 RX ADMIN — TAMSULOSIN HYDROCHLORIDE 0.4 MG: 0.4 CAPSULE ORAL at 08:39

## 2022-01-04 RX ADMIN — LISINOPRIL 2.5 MG: 2.5 TABLET ORAL at 08:39

## 2022-01-04 RX ADMIN — ATORVASTATIN CALCIUM 20 MG: 20 TABLET, FILM COATED ORAL at 08:39

## 2022-01-04 RX ADMIN — MEMANTINE 10 MG: 10 TABLET ORAL at 08:43

## 2022-01-04 RX ADMIN — MORPHINE SULFATE 2 MG: 2 INJECTION, SOLUTION INTRAMUSCULAR; INTRAVENOUS at 01:02

## 2022-01-04 RX ADMIN — INSULIN LISPRO 3 UNITS: 100 INJECTION, SOLUTION INTRAVENOUS; SUBCUTANEOUS at 12:18

## 2022-01-04 RX ADMIN — Medication 10 ML: at 08:40

## 2022-01-04 RX ADMIN — SENNOSIDES AND DOCUSATE SODIUM 2 TABLET: 50; 8.6 TABLET ORAL at 08:39

## 2022-01-04 RX ADMIN — PRIMIDONE 100 MG: 50 TABLET ORAL at 08:39

## 2022-01-04 NOTE — NURSING NOTE
Exercise Oximetry    Patient Name:Sage Michael   MRN: [id number]   Date: 01/04/22           EXERCISE ON ROOM AIR SpO2% EXERCISE ON O2 @ 1.5 LPM SpO2%   1 MINUTE 94 1 MINUTE    2 MINUTES 94 2 MINUTES    3 MINUTES 92 3 MINUTES    4 MINUTES 90 4 MINUTES    5 MINUTES 87 5 MINUTES    6 MINUTES 80 6 MINUTES  recovered to 96%               Distance Walked    Dyspnea (Eugenia Scale)    Fatigue (Eugenia Scale)    SpO2% Post Exercise    HR Post Exercise    Time to Recovery

## 2022-01-04 NOTE — DISCHARGE SUMMARY
[facility]         HOSPITALIST  DISCHARGE SUMMARY    Patient Name: Sage Michael  : 1932  MRN: [id number]    Date of Admission: 1/3/2022  Date of Discharge: 2022  Primary Care Physician: Laura Kemp APRN    Consults     No orders found for last 30 day(s).          Active and Resolved Hospital Problems:  Active Hospital Problems    Diagnosis POA   • **Acute respiratory failure with hypoxia ([facility]) [J96.01] Yes   • Paroxysmal atrial fibrillation (HCC) [I48.0] Yes   • CHF (congestive heart failure) (HCC) [I50.9] Yes   • COPD with exacerbation (HCC) [J44.1] Yes   • Dementia ([facility]) [F03.90] Yes   • Type 2 diabetes mellitus with hyperglycemia, without long-term current use of insulin (HCC) [E11.65] Yes   • Parkinson disease (HCC) [G20] Yes      Resolved Hospital Problems   No resolved problems to display.       Hospital Course     Hospital Course:  Sage Michael is a 89 y.o. male who present with chief complaint of increased work of breathing.  Patient was noted to be hypoxic at home per family.  Prior to initial presentation patient was evaluated by primary care provider and he was subsequently diagnosed with bronchitis and started on azithromycin prednisone.  Patient continued to have worsening shortness of breath therefore he is brought to ER for further evaluation.  Upon arrival patient was noted to be 86% on room air and is placed on supplemental oxygen.  It was noted on an ABG that his PO2 was 51.  proBNP was also checked that was 3000.  Patient's chest x-ray showed bibasilar interstitial markings.  He subsequently received intravenous Lasix and diuresed well.  Patient was given a dose of azithromycin and Rocephin empirically for questionable pneumonia.  On review patient did not have elevated white blood cell count and his procalcitonin was normal, therefore antibiotics were discontinued.  Patient also was receiving antibiotics as an outpatient and did not improve.  I suspect  that this was related to acute heart failure exacerbation.  Patient had issues with sundowning while admitted.  He will be given a prescription for trazodone to use as needed at home for sleep.  He will continue his other home medications as before.  It is recommended that upon follow-up with PCP that patient be considered for Lasix intermittently.  He will not be started on Lasix this time due to risk of hypotension and syncope.  Patient will follow up with PCP in 1 week.      DISCHARGE Follow Up Recommendations for labs and diagnostics:   PCP 1 week.      Day of Discharge     Vital Signs:  Temp:  [97.5 °F (36.4 °C)-97.8 °F (36.6 °C)] 97.5 °F (36.4 °C)  Heart Rate:  [58-90] 75  Resp:  [16-20] 16  BP: (120-178)/(55-99) 177/85  Flow (L/min):  [1.5-3] 1.5    Physical Exam:   Constitutional: Alert, awake, no acute distress, elderly  HEENT:  PERRLA, EOMI; No Scleral icterus  Neck:  Supple; No Mass, No Lymphadenopathy  Cardiovascular:  No Rubs, No Edema, No JVD  Respiratory: No respiratory distress, unlabored breathing, on nasal cannula, no wheezing  Abdomen:  Normal BS all 4 Quadrants; No Guarding, No Tenderness  Musculoskeletal:  Pulses Positive all 4 Ext; No Cyanosis, No Edema  Neurological: Responds to questioning, appears to be around baseline, no focal deficits, demented  Skin:  No Rash, No Cellulitis, No Erythema      Discharge Details        Discharge Medications      New Medications      Instructions Start Date   traZODone 50 MG tablet  Commonly known as: DESYREL   50 mg, Oral, Nightly PRN         Continue These Medications      Instructions Start Date   ALCOHOL WIPES EX   Apply externally      aspirin 81 MG EC tablet   81 mg, Oral, Daily      donepezil 5 MG tablet  Commonly known as: ARICEPT   5 mg, Oral, Nightly      FREESTYLE LITE test strip  Generic drug: glucose blood   1 each, Other, As Needed, Use as instructed       levothyroxine 50 MCG tablet  Commonly known as: Synthroid   50 mcg, Oral, Daily       lisinopril 2.5 MG tablet  Commonly known as: PRINIVIL,ZESTRIL   2.5 mg, Oral, Daily      memantine 10 MG tablet  Commonly known as: NAMENDA   10 mg, Oral, 2 Times Daily      primidone 50 MG tablet  Commonly known as: MYSOLINE   100 mg, Oral, 3 Times Daily      Prolia 60 MG/ML solution prefilled syringe syringe  Generic drug: denosumab   60 mg, Subcutaneous, Every 6 Months      simvastatin 40 MG tablet  Commonly known as: ZOCOR   40 mg, Oral, Nightly      SITagliptin 50 MG tablet  Commonly known as: JANUVIA   50 mg, Oral, Daily      tamsulosin 0.4 MG capsule 24 hr capsule  Commonly known as: FLOMAX   0.4 mg, Oral, Daily         Stop These Medications    azithromycin 250 MG tablet  Commonly known as: Zithromax Z-Endy     methylPREDNISolone 4 MG dose pack  Commonly known as: MEDROL            Allergies   Allergen Reactions   • Iodine Unknown - Low Severity       Discharge Disposition:  Home or Self Care    Diet:  Hospital:  Diet Order   Procedures   • Diet Regular; Cardiac, Consistent Carbohydrate       Discharge Activity:       CODE STATUS:  Code Status and Medical Interventions:   Ordered at: 01/03/22 1915     Level Of Support Discussed With:    Patient     Code Status (Patient has no pulse and is not breathing):    CPR (Attempt to Resuscitate)     Medical Interventions (Patient has pulse or is breathing):    Full         Future Appointments   Date Time Provider Department Center   1/10/2022 11:45 AM Laura Kemp APRN [facility]   3/18/2022 12:30 PM CHAIR [facility]       Additional Instructions for the Follow-ups that You Need to Schedule     Discharge Follow-up with PCP   As directed       Currently Documented PCP:    Laura Kemp APRN    PCP Phone Number:    [phone]     Follow Up Details: 1 wk               Pertinent  and/or Most Recent Results     PROCEDURES:   XR Chest 1 View    Result Date: 1/3/2022  Narrative: PROCEDURE: XR CHEST 1 VW  [facility], [facility] 13:36.  [facility], CR, XR CHEST PA AND LATERAL, 12/29/2021, 10:50.  INDICATIONS: SOA Triage Protocol  FINDINGS:  The heart is not definitely enlarged.  There remains prominence of markings in the lower lungs that could be due to multi focal inflammatory infectious process.  Some chronic interstitial lung disease not excluded.  There are prominent shadows in the upper lobes that look like they relate to the 1st ribs and has been noted.  There are no pleural effusions.  There are degenerative changes involving what is visualized of the right shoulder.  CONCLUSION:  1. Prominence of markings within the lower lungs which has been suggested and could be reflective of an inflammatory infectious process.  Some chronic interstitial lung disease could not be excluded. 2. Moderate degenerative change right shoulder.       LINNETTE GONZALEZ MD       Electronically Signed and Approved By: LINNETTE GONZALEZ MD on 1/03/2022 at 13:31             XR Chest PA & Lateral    Result Date: 12/29/2021  Narrative: PROCEDURE: XR CHEST PA AND LATERAL  COMPAR[facility]: [facility], CR, CHEST PA/AP & LAT 2V, 11/03/2018, 13:36.  INDICATIONS: couigh, SOA, weakness x 2 days  FINDINGS:  Two plain film images chest are compared previous study performed on 11/3/2018 today's study reveals heart mediastinum are normal.  There are bilateral diffuse increased markings consistent with chronic lung disease and bronchitis more prominent since previous study.  No evidence of pneumonia or pleural effusion or pneumothorax or mass right or left lungs.  Arthritis is seen in the right glenohumeral shoulder joint.  CONCLUSION:  1. Bilateral diffuse increased markings more prominent since previous study performed on 11/3/2018 consistent with chronic lung disease and bronchitis     IQRA SHERIFF MD       Electronically Signed and Approved By: IQRA SHERIFF MD on 12/29/2021 at 11:16                  LAB RESULTS:      Lab 01/04/22  0802 01/03/22 2357 01/03/22  1906 01/03/22  1254 12/29/21  1212   WBC 8.02 10.74  --  6.20 7.39   HEMOGLOBIN 13.3 12.8*  --  11.6* 12.6*   HEMATOCRIT 42.0 40.0  --  35.5* 39.0   PLATELETS 159 207  --  195 152   NEUTROS ABS 5.33 7.58*  --  4.09 4.68   IMMATURE GRANS (ABS) 0.03 0.04  --  0.03 0.04   LYMPHS ABS 1.48 1.81  --  1.38 1.72   MONOS ABS 0.70 0.77  --  0.41 0.75   EOS ABS 0.44* 0.49*  --  0.26 0.17   .3* 102.0*  --  102.0* 98.2*   PROCALCITONIN 0.05  --   --   --   --    LACTATE  --   --  1.2  --   --          Lab 01/04/22  0802 01/03/22 2357 01/03/22  1254 12/29/21  1212   SODIUM 139 146* 142 145   POTASSIUM 4.8 4.3 4.7 4.9   CHLORIDE 107 105 106 108*   CO2 19.7* 27.3 25.8 29.3*   ANION GAP 12.3 13.7 10.2 7.7   BUN 17 18 20 17   CREATININE 1.01 1.13 1.11 1.23   GLUCOSE 196* 199* 281* 144*   CALCIUM 8.9 9.3 8.8 9.6   MAGNESIUM 2.1 2.1  --  2.1   PHOSPHORUS 2.6  --   --   --    TSH  --  2.090  --  2.180         Lab 01/04/22  0802 01/03/22 2357 01/03/22  1254 12/29/21  1212   TOTAL PROTEIN 7.2 7.8 7.1 7.1   ALBUMIN 3.50 4.40 4.00 4.30   GLOBULIN 3.7 3.4 3.1 2.8   ALT (SGPT) 61* 72* 65* 40   AST (SGOT) 28 30 24 21   BILIRUBIN 0.3 0.3 0.2 0.3   ALK PHOS 126* 135* 125* 115         Lab 01/03/22  1254 12/29/21  1212   PROBNP 3,000.0* 2,975.0*   TROPONIN T 0.024  --                  Lab 01/03/22  1419   PH, ARTERIAL 7.410   PCO2, ARTERIAL 41.0   PO2 ART 51.5*   O2 SATURATION ART 86.8*   FIO2 21   HCO3 ART 25.4   BASE EXCESS ART 0.7   CARBOXYHEMOGLOBIN 0.7     Brief Urine Lab Results  (Last result in the past 365 days)      Color   Clarity   Blood   Leuk Est   Nitrite   Protein   CREAT   Urine HCG        01/15/21 1120   CLEAR   NEGATIVE   NEGATIVE  Comment: URINE MICROSCOPICS:    Only performed if any of the following are present:    Appearance is Sl Cloudy to Turbid; Protein is    30 to >/= 300 mg/dL; Occult Blood, Nitrite, or Leukocyte    Esterase is positive. Color  is Red or Orange.     NEGATIVE                 Microbiology Results (last 10 days)     Procedure Component Value - Date/Time    COVID-19,APTIMA PANTHER(ED),BH TRUDI/BH ESTRELLITA, NP/OP SWAB IN UTM/VTM/SALINE TRANSPORT MEDIA,24 HR TAT - Swab, Nasopharynx [[id number]]  (Normal) Collected: 01/03/22 1413    Lab Status: Final result Specimen: Swab from Nasopharynx Updated: 01/03/22 2114     COVID19 Not Detected    Narrative:      Fact sheet for providers: https://www.fda.gov/media/143170/download     Fact sheet for patients: https://www.fda.gov/media/879377/download    Test performed by RT PCR.                           Labs Pending at Discharge:  Pending Labs     Order Current Status    Blood Culture - Blood, Arm, Left In process    Blood Culture - Blood, Arm, Left In process            Time spent on Discharge including face to face service:  31 minutes    Electronically signed by Dwayne Valentine DO, 01/04/22, 2:59 PM EST.

## 2022-01-04 NOTE — OUTREACH NOTE
Prep Survey      Responses   [facility] [place] patient discharged from? [facility]   Is LACE score < 7 ? No   Emergency Room discharge w/ pulse ox? No   Eligibility [facility]   Date of Admission 01/03/22   Date of Discharge 01/04/22   Discharge Disposition Home or Self Care   Discharge diagnosis acute heart failure exacerbation   Does the patient have one of the following disease processes/diagnoses(primary or secondary)? CHF   Does the patient have Home health ordered? No   Is there a DME ordered? No   Prep survey completed? Yes          Chantale Boyer RN

## 2022-01-04 NOTE — ED NOTES
Pt COVID test is negative. Informed bed placement. New room assignment in progress.     Sandrine Mckinney RN  01/03/22 2123

## 2022-01-04 NOTE — H&P
History and Physical   Sage Michael , :  1932, MRN:  [id number]    Chief complaint: Shortness of breath, lower extremity swelling    Assessment/Plan       Acute respiratory failure with hypoxia (HCC)    Type 2 diabetes mellitus with hyperglycemia, without long-term current use of insulin (HCC)    Dementia (HCC)    Parkinson disease (HCC)    COPD with exacerbation (HCC)    Paroxysmal atrial fibrillation (HCC)    CHF (congestive heart failure) (HCC)      • Acute respiratory failure with hypoxia: The patient presented to the emergency department with increased work of breathing.  Reportedly treated last week by his primary care for bronchitis with azithromycin and steroids.  Patient's daughter reports no significant improvement.  Saturation on arrival was 86% on room air, with increased work of breathing.  He was started on oxygen.  Suspect underlying congestive heart failure.  Continue on oxygen, wean as tolerated.    • Congestive heart failure, unspecified: The patient has a slightly elevated BNP at 3000.  He was saturating 86% on room air.  Chest x-ray shows bibasilar lower lobe markings.  Patient has pitting edema in his lower extremities.  He was given a dose of Lasix in the emergency department with robust response.  We will continue to monitor overnight, monitor ins and outs, monitor daily weights.  Obtain echocardiogram in the morning.    • Chronic obstructive pulmonary disease with acute exacerbation: Patient has mild wheezing on exam.  Resuming on home respiratory medications.  Duo nebs as needed.  Hold on steroids.    • Paroxysmal atrial fibrillation: Patient's daughter believes he carries a diagnosis of A. fib.  Not currently on any anticoagulation.  He is rate controlled in the emergency department.    • Parkinson's disease with dementia: Patient is confused in the emergency department, patient's daughter reports this is his baseline.  Resume home primidone.     • Diabetes mellitus with  hyperglycemia: On oral medications at home.  Will place on sliding scale insulin while hospitalized.    • Clinical course will dictate further medical management.    DVT Prophylaxis: Lovenox  Code Status: Full    Reviewed patients labs and imaging, and discussed with patient and nurse at bedside.    Patient risk level:  Patient comorbidities and risk factors make patient a poor candidate for outpatient management due to concerns of deterioration.    Total time spent on admission: 70 minutes    History of Present illness     Sage Michael is a 89 y.o. old male patient who presents with increased work of breathing, hypoxia at home.  Patient has a history of diabetes, dementia, hyperlipidemia, chronic obstructive pulmonary disease, atrial fibrillation, hypothyroidism.    The patient was reportedly having increased work of breathing last week.  He was seen by his primary care provider who diagnosed him with bronchitis.  He was placed on azithromycin and prednisone.  The patient's daughter reports she is noted him to continue to have increased work of breathing at home.  She also notes he has been having ongoing lower extremity edema for the past 1 to 2 weeks.  He does not get weight at home.  No fevers or chills.  Reports occasional cough.    ED course: On arrival to the emergency department the patient was saturating 86% on room air.  Blood work showed a normal white blood cell count, elevated glucose at 281.  ABG showed a PO2 of 51.  BNP was elevated at 3000.  Troponin was normal.  Chest x-ray showed bibasilar interstitial markings.  The patient was given a dose of 40 mg of Lasix in the emergency department and the hospitalist service was asked to admit the patient for further evaluation and management.    Old records were reviewed which revealed no previous admissions to the hospital.    Review of Systems     Unable to obtain due to patient's underlying dementia.    Past Medical/Surgical/Social/Family  History/Allergies     Past Medical History:   Diagnosis Date   • Arthritis    • Broken bones    • Cataract    • Colon abnormality    • Controlled type 1 diabetes mellitus with diabetic neuropathy ([facility]) 07/25/2017   • Deafness    • Dementia ([facility])    • Diabetes mellitus type 2, noninsulin dependent ([facility])    • Diabetic neuropathy ([facility]) 05/12/2016   • Diverticula of colon    • Foot pain, bilateral 01/23/2018   • Forgetfulness    • GERD (gastroesophageal reflux disease)    • Hemorrhoid    • Hyperlipidemia 05/12/2016   • Hypertension    • Ingrowing nail 06/27/2018   • Ingrowing toenail 09/24/2018   • Lumbago    • Lumbar compression fracture ([facility]) 05/20/2021    MULITPLE AGE-INDETERMINATE   • Neurologic disorder    • Neuropathy    • Parkinson disease ([facility]) 05/12/2016   • Peripheral vascular disease ([facility])    • Polyneuropathy 07/25/2017   • Tinea unguium 07/25/2017       Past Surgical History:   Procedure Laterality Date   • AORTA - FEMORAL ARTERY BYPASS GRAFT     • CAROTID STENT      CAROTID ARTERY STENTING   • CATARACT EXTRACTION         Social History     Socioeconomic History   • Marital status:    Tobacco Use   • Smoking status: Former Smoker     Packs/day: 1.50     Years: 50.00     Pack years: 75.00     Quit date: 2019     Years since quitting: 3.0   • Smokeless tobacco: Former User     Types: Chew     Quit date: 2019   • Tobacco comment: QUIT 2019   Vaping Use   • Vaping Use: Never used   Substance and Sexual Activity   • Alcohol use: Not Currently   • Drug use: Never   • Sexual activity: Defer       Family History   Problem Relation Age of Onset   • Arthritis Mother    • Arthritis Father    • Arthritis Sister    • Stroke Sister    • Arthritis Brother    • Arthritis Daughter        Allergies   Allergen Reactions   • Iodine Unknown - Low Severity       The above past medical history, past surgical history, social history, family history allergies and home medications were personally reviewed by me today and  corrected as needed  Home Medications   (Not in a hospital admission)    Physical Exam   Vital signs:  Temperature Blood Pressure Heart Rate Resp Rate SpO2   Temp: 97.6 °F (36.4 °C) BP: 141/55 Heart Rate: 68 Resp: 18 SpO2: (!) 86 %     HEENT: Head is atraumatic, extraocular movements are intact. Oropharynx is normal.  Neck: Supple, no cervical lymphadenopathy.  Cardiovascular: Irregularly irregular rate and rhythm. No murmurs, gallops or rubs.  2+ bilateral lower extremity pitting edema.  Lungs: Bibasilar crackles to mid lung with scattered expiratory wheezes.  Abdomen: Normal bowel sounds in all 4 quadrants. No rebound, guarding or tenderness.   Chest: Normal inspection. Equal rise and fall. No retractions noted.  Constitutional: Chronically ill-appearing.  Appears stated age.  Lymphatic: No cervical lymphadenopathy.  Extremities: 5/5 upper and lower extremity strength. Normal range of motion.  No clubbing, cyanosis.  Neurological: Alert and oriented to person.    Psychological: Normal mood and affect. Well kempt. Normal eye contact.  Skin: No rash, cellulitis or bruising.    Labs     Results from last 7 days   Lab Units 01/03/22  1254 12/29/21  1212   WBC 10*3/mm3 6.20 7.39   HEMOGLOBIN g/dL 11.6* 12.6*   HEMATOCRIT % 35.5* 39.0   PLATELETS 10*3/mm3 195 152     Results from last 7 days   Lab Units 01/03/22  1254 12/29/21  1212   SODIUM mmol/L 142 145   POTASSIUM mmol/L 4.7 4.9   CHLORIDE mmol/L 106 108*   CO2 mmol/L 25.8 29.3*   BUN mg/dL 20 17   CREATININE mg/dL 1.11 1.23   CALCIUM mg/dL 8.8 9.6     Results from last 7 days   Lab Units 01/03/22  1254 12/29/21  1212   ALT (SGPT) U/L 65* 40   AST (SGOT) U/L 24 21   ALK PHOS U/L 125* 115   BILIRUBIN mg/dL 0.2 0.3                   Invalid input(s): CPK, MASSCKMB  Results from last 7 days   Lab Units 01/03/22  1419   PH, ARTERIAL pH units 7.410   PO2 ART mm Hg 51.5*   PCO2, ARTERIAL mm Hg 41.0       I reviewed patient's labs from today and also previous labs while  reviewing old records   Imaging and Other procedures     Chest X ray: My independent assessment showed bibasilar interstitial markings.  EKG: My independent evaluation showed heart rate 85, normal sinus rhythm, no ST -T changes    I reviewed patient's imaging and other testing from today and also in the past including but not limited to imaging, previous imaging, EKG, previous EKGs, echocardiogram, and other procedures if any and previously done by reviewing old records  Current Medications   Scheduled Meds:azithromycin, 500 mg, Intravenous, Once  cefTRIAXone, 1 g, Intravenous, Once      Continuous Infusions:     01/03/22  19:10 EST

## 2022-01-04 NOTE — NURSING NOTE
Patient confused, out of bed multiple times, setting bed alert off. Patient attempting to remove nasal cannula, continuous pulse ox, purewick, and heart monitor. Patient does not understand he is a falls risk. Patient given 50mg trazodone, 25mg benadryl, and 5mg melatonin per MD order. MD aware of patients extreme confusion and safety risk. Inquired with MD about the possibility of restraints. A roll belt or vest was advised, to which neither would keep patient from removing/pulling lines and tubing.  notified for safety sitter, no one readily available to sit in safety watch. Also inquired about a posey bed, advised that these are no longer kept in house. This nurse concerned for patient's safety due to medications and confusion. Will continue to monitor at this time. Richelle Gonzalez RN

## 2022-01-05 ENCOUNTER — NURSE TRIAGE (OUTPATIENT)
Dept: CALL CENTER | Facility: HOSPITAL | Age: 87
End: 2022-01-05

## 2022-01-05 ENCOUNTER — TRANSITIONAL CARE MANAGEMENT TELEPHONE ENCOUNTER (OUTPATIENT)
Dept: CALL CENTER | Facility: HOSPITAL | Age: 87
End: 2022-01-05

## 2022-01-05 NOTE — TELEPHONE ENCOUNTER
"    Reason for Disposition  • General information question, no triage required and triager able to answer question    Additional Information  • Negative: [1] Caller is not with the adult (patient) AND [2] reporting urgent symptoms  • Negative: Lab result questions  • Negative: Medication questions  • Negative: Caller can't be reached by phone  • Negative: Caller has already spoken to PCP or another triager  • Negative: RN needs further essential information from caller in order to complete triage  • Negative: Requesting regular office appointment  • Negative: [1] Caller requesting NON-URGENT health information AND [2] PCP's office is the best resource  • Negative: Health Information question, no triage required and triager able to answer question    Answer Assessment - Initial Assessment Questions  1. REASON FOR CALL or QUESTION: \"What is your reason for calling today?\" or \"How can I best help you?\" or \"What question do you have that I can help answer?\"  Discharged yesterday and requesting new Rx be sent to alternate pharmacy. Please send script to    [facility] in [place].  Allie, daughter, [phone]    This note sent to case mamagement    Protocols used: INFORMATION ONLY CALL - NO TRIAGE-ADULT-      "

## 2022-01-05 NOTE — OUTREACH NOTE
Call Center TCM Note      Responses   [facility] patient discharged from? [facility]   Does the patient have one of the following disease processes/diagnoses(primary or secondary)? CHF   TCM attempt successful? Yes   Call start time 0826   Call end time 0832   Discharge diagnosis acute heart failure exacerbation   Person spoke with today (if not patient) and relationship piotr Kelley   Meds reviewed with patient/caregiver? Yes   Is the patient having any side effects they believe may be caused by any medication additions or changes? No   Does the patient have all medications ordered at discharge? Yes   Is the patient taking all medications as directed (includes completed medication regime)? Yes   Medication comments Trazadone was sent to wrong pharmacy--dtr called to have medication resent to another pharmacy and will  tomorrow   Does the patient have a primary care provider?  Yes   Does the patient have an appointment with their PCP within 7 days of discharge? Yes   Comments regarding PCP  PCP FOLLOW UP APPOINTMENT IS 1/10/22@3788---appt is a prescheduled appt, will route to determine if it can be revised for hospital f/u   Has the patient kept scheduled appointments due by today? N/A   Has home health visited the patient within 72 hours of discharge? N/A   Has all DME been delivered? Yes   DME comments O2 at 2 lpm per nc   Pulse Ox monitoring Intermittent   Pulse Ox device source Patient   O2 Sat comments Sats maintaining above 90%   O2 Sat: education provided Sat levels,  Monitoring frequency,  When to seek care   O2 Sat education comments Seek immediate medical attention for oxygen sats less than 88%-90% that do not improve with rest and oxygen   Psychosocial issues? No   Did the patient receive a copy of their discharge instructions? Yes   Nursing interventions Reviewed instructions with patient   What is the patient's perception of their health status since discharge? Same  [Dtr reports pt restless  during the night--pullling off oxygen, someone had to watch him constantly to ensure O2 in place. Sats in 90's.  ]   Nursing interventions Nurse provided patient education  [Noted that pt was not started on diuretic but discussed and deferred to PCP for consideration of prn dosage-.  Encouraged dtr to monitor for edema to feet/ankles daily and encouraged daily weights. ]   Is the patient weighing daily? No   Does the patient have scales? Yes   Daily weight interventions Education provided on importance of daily weight  [Reviewed weight gain parameters of 3# day/5 # week require MD notification-encouraged to weigh daily and record to take to MD appts for review---v/u]   Is the patient able to teach back Heart Failure diet management? No   Is the patient able to teach back Heart Failure Zones? No   Is the patient able to teach back signs and symptoms of worsening condition? (i.e. weight gain, shortness of air, etc.) Yes  [REviewed with dtr]   Is the patient/caregiver able to teach back the hierarchy of who to call/visit for symptoms/problems? PCP, Specialist, Home health nurse, Urgent Care, ED, 911 Yes   TCM call completed? Yes           Emily Amaya RN    1/5/2022, 08:42 EST

## 2022-01-05 NOTE — OUTREACH NOTE
Spoke with Brittany at [facility] and she states that they have patient's Trazodone.     Called pt's daughter Allie and informed her that the medication is at the [facility]. She states that she will go pick it up tomorrow.

## 2022-01-08 LAB
BACTERIA SPEC AEROBE CULT: NORMAL
BACTERIA SPEC AEROBE CULT: NORMAL

## 2022-01-10 ENCOUNTER — TELEPHONE (OUTPATIENT)
Dept: INTERNAL MEDICINE | Facility: CLINIC | Age: 87
End: 2022-01-10

## 2022-01-10 ENCOUNTER — OFFICE VISIT (OUTPATIENT)
Dept: INTERNAL MEDICINE | Facility: CLINIC | Age: 87
End: 2022-01-10

## 2022-01-10 VITALS
OXYGEN SATURATION: 100 % | RESPIRATION RATE: 18 BRPM | HEART RATE: 84 BPM | BODY MASS INDEX: 23.32 KG/M2 | SYSTOLIC BLOOD PRESSURE: 121 MMHG | DIASTOLIC BLOOD PRESSURE: 64 MMHG | WEIGHT: 140 LBS | TEMPERATURE: 96.2 F | HEIGHT: 65 IN

## 2022-01-10 DIAGNOSIS — I50.9 CONGESTIVE HEART FAILURE, UNSPECIFIED HF CHRONICITY, UNSPECIFIED HEART FAILURE TYPE: Primary | ICD-10-CM

## 2022-01-10 DIAGNOSIS — R09.02 HYPOXIA: ICD-10-CM

## 2022-01-10 DIAGNOSIS — M81.0 AGE-RELATED OSTEOPOROSIS WITHOUT CURRENT PATHOLOGICAL FRACTURE: ICD-10-CM

## 2022-01-10 DIAGNOSIS — J96.01 ACUTE RESPIRATORY FAILURE WITH HYPOXIA: ICD-10-CM

## 2022-01-10 LAB
ALBUMIN SERPL-MCNC: 4.2 G/DL (ref 3.5–5.2)
ALBUMIN/GLOB SERPL: 1.3 G/DL
ALP SERPL-CCNC: 132 U/L (ref 39–117)
ALT SERPL W P-5'-P-CCNC: 37 U/L (ref 1–41)
ANION GAP SERPL CALCULATED.3IONS-SCNC: 9.6 MMOL/L (ref 5–15)
AST SERPL-CCNC: 22 U/L (ref 1–40)
BILIRUB SERPL-MCNC: 0.2 MG/DL (ref 0–1.2)
BUN SERPL-MCNC: 26 MG/DL (ref 8–23)
BUN/CREAT SERPL: 21.1 (ref 7–25)
CALCIUM SPEC-SCNC: 10 MG/DL (ref 8.6–10.5)
CHLORIDE SERPL-SCNC: 101 MMOL/L (ref 98–107)
CO2 SERPL-SCNC: 27.4 MMOL/L (ref 22–29)
CREAT SERPL-MCNC: 1.23 MG/DL (ref 0.76–1.27)
GFR SERPL CREATININE-BSD FRML MDRD: 55 ML/MIN/1.73
GLOBULIN UR ELPH-MCNC: 3.2 GM/DL
GLUCOSE SERPL-MCNC: 181 MG/DL (ref 65–99)
MAGNESIUM SERPL-MCNC: 2.3 MG/DL (ref 1.6–2.4)
NT-PROBNP SERPL-MCNC: 549.3 PG/ML (ref 0–1800)
PHOSPHATE SERPL-MCNC: 3.2 MG/DL (ref 2.5–4.5)
POTASSIUM SERPL-SCNC: 5.1 MMOL/L (ref 3.5–5.2)
PROT SERPL-MCNC: 7.4 G/DL (ref 6–8.5)
SODIUM SERPL-SCNC: 138 MMOL/L (ref 136–145)

## 2022-01-10 PROCEDURE — 80053 COMPREHEN METABOLIC PANEL: CPT | Performed by: NURSE PRACTITIONER

## 2022-01-10 PROCEDURE — 1111F DSCHRG MED/CURRENT MED MERGE: CPT | Performed by: NURSE PRACTITIONER

## 2022-01-10 PROCEDURE — 36415 COLL VENOUS BLD VENIPUNCTURE: CPT | Performed by: NURSE PRACTITIONER

## 2022-01-10 PROCEDURE — 99496 TRANSJ CARE MGMT HIGH F2F 7D: CPT | Performed by: NURSE PRACTITIONER

## 2022-01-10 PROCEDURE — 83880 ASSAY OF NATRIURETIC PEPTIDE: CPT | Performed by: NURSE PRACTITIONER

## 2022-01-10 PROCEDURE — 84100 ASSAY OF PHOSPHORUS: CPT | Performed by: NURSE PRACTITIONER

## 2022-01-10 PROCEDURE — 83735 ASSAY OF MAGNESIUM: CPT | Performed by: NURSE PRACTITIONER

## 2022-01-10 RX ORDER — TAMSULOSIN HYDROCHLORIDE 0.4 MG/1
1 CAPSULE ORAL DAILY
Qty: 90 CAPSULE | Refills: 0 | Status: SHIPPED | OUTPATIENT
Start: 2022-01-10 | End: 2022-03-21 | Stop reason: SDUPTHER

## 2022-01-10 RX ORDER — MEMANTINE HYDROCHLORIDE 10 MG/1
10 TABLET ORAL 2 TIMES DAILY
Qty: 180 TABLET | Refills: 0 | Status: SHIPPED | OUTPATIENT
Start: 2022-01-10 | End: 2022-03-21 | Stop reason: SDUPTHER

## 2022-01-10 RX ORDER — FUROSEMIDE 20 MG/1
20 TABLET ORAL DAILY
Qty: 30 TABLET | Refills: 0 | Status: SHIPPED | OUTPATIENT
Start: 2022-01-10 | End: 2022-03-21 | Stop reason: SDUPTHER

## 2022-01-10 NOTE — PROGRESS NOTES
"Transitional Care Follow Up Visit  Subjective     Sage is a 89 y.o. male who presents for a transitional care management visit.    Within 48 business hours after discharge our office contacted him via telephone to coordinate his care and needs.      I reviewed and discussed the details of that call along with the discharge summary, hospital problems, inpatient lab results, inpatient diagnostic studies, and consultation reports with Sage.     Current outpatient and discharge medications have been reconciled for the patient.  Reviewed by: ULISSES Corona      Date of TCM Phone Call 1/4/2022   [facility]   Date of Admission 1/3/2022   Date of Discharge 1/4/2022   Discharge Disposition Home or Self Care       Risk for Readmission (LACE) Score: 10 (1/4/2022  6:01 AM)      History of Present Illness     Course During Hospital Stay {The following information was reviewed by (Optional):[id number]} ***     {Common H&P Review Areas:21850} {Review (Popup) :23}    Vitals:    01/10/22 1210   BP: 121/64   BP Location: Left arm   Patient Position: Sitting   Cuff Size: Adult   Pulse: 84   Resp: 18   Temp: 96.2 °F (35.7 °C)   SpO2: 100%  Comment: 2 Liters   Weight: 63.5 kg (140 lb)   Height: 165.1 cm (65\")       Review of Systems    Objective   Physical Exam      Assessment/Plan     Diagnoses and all orders for this visit:    1. Congestive heart failure, unspecified HF chronicity, unspecified heart failure type (HCC) (Primary)  -     Adult Transthoracic Echo Complete w/ Color, Spectral and Contrast if necessary per protocol; Future    2. Hypoxia  -     Adult Transthoracic Echo Complete w/ Color, Spectral and Contrast if necessary per protocol; Future    3. Acute respiratory failure with hypoxia (HCC)  -     Adult Transthoracic Echo Complete w/ Color, Spectral and Contrast if necessary per protocol; Future    4. Age-related osteoporosis without current pathological fracture  -     Comprehensive metabolic " panel  -     Magnesium  -     Phosphorus    Other orders  -     memantine (NAMENDA) 10 MG tablet; Take 1 tablet by mouth 2 (Two) Times a Day for 90 days.  Dispense: 180 tablet; Refill: 0  -     SITagliptin (JANUVIA) 50 MG tablet; Take 1 tablet by mouth Daily for 90 days.  Dispense: 90 tablet; Refill: 0  -     tamsulosin (FLOMAX) 0.4 MG capsule 24 hr capsule; Take 1 capsule by mouth Daily.  Dispense: 90 capsule; Refill: 0  -     furosemide (Lasix) 20 MG tablet; Take 1 tablet by mouth Daily.  Dispense: 30 tablet; Refill: 0        Follow Up {  Wrapup  Review (Popup)  Communications :23}    Return in about 4 weeks (around 2/7/2022).    {Time Spent (Optional):16244}

## 2022-01-10 NOTE — TELEPHONE ENCOUNTER
Spoke with lab at [facility]. Added on BNP, CBC, and CMP per Laura GTZ. Lab said okay to do CMP and BNP, unable to do CBC due to no Lavender tube done.

## 2022-01-10 NOTE — PROGRESS NOTES
Transitional Care Follow Up Visit  Subjective     Sage is a 89 y.o. male who presents for a transitional care management visit.    Within 48 business hours after discharge our office contacted him via telephone to coordinate his care and needs.      I reviewed and discussed the details of that call along with the discharge summary, hospital problems, inpatient lab results, inpatient diagnostic studies, and consultation reports with Sage.     Current outpatient and discharge medications have been reconciled for the patient.  Reviewed by: ULISSES Corona      Date of TCM Phone Call 1/4/2022   [facility]   Date of Admission 1/3/2022   Date of Discharge 1/4/2022   Discharge Disposition Home or Self Care       Risk for Readmission (LACE) Score: 10 (1/4/2022  6:01 AM)      History of Present Illness     Course During Hospital Stay The following information was reviewed by: ULISSES Corona on 01/10/2022:   Hospital course: Sage Michael is an 89-year-old male who presents with chief complaint of increased work of breathing. Patient was noted to be hypoxic at home per family. Prior to initial presentation patient was evaluated by primary care provider and he was subsequently diagnosed with bronchitis and started on Zithromax and prednisone. Patient continued to have worsening shortness of breath, therefore he was brought to ER for further evaluation upon arrival. Patient was noted to be 86% on room air and is placed on supplemental oxygen. Was noted on ABD that his p.o. two was 51. Prob P was also checked and that was 3000. Patient's chest x-ray showed bibasilar interstitial markings. He subsequently received intravenous Lasix and diuresed well. Patient was given a dose of zithromax  Rocephin empirically for questionable pneumonia. On review, patient did not have elevated white blood cell count and his procalcitonin was normal. Therefore, antibiotics were discontinued. Patient also was  "receiving antibiotics as an outpatient and did not improve. I suspected that this was related to acute heart failure exacerbation. Patient had issues with sun downing while admitted. He will be given a prescription for trazodone to use as needed at home for sleep. He will continue his other home medications as before. It is recommended that upon follow up with PCP that the patient be considered for Lasix intermittently. He will not start on Lasix this time due to risk of hypotension and syncope. Patient will follow up with PCP in 1 week     The following portions of the patient's history were reviewed and updated as appropriate: allergies, current medications, past family history, past medical history, past social history, past surgical history and problem list.     Sage Michael is a 89-year-old male who presents today for hospital follow-up. He was admitted on 01/03/2022 for acute respiratory failure with hypoxia.  He is accompanied by his daughter today.     Per daughter, she is unsure where he has ever taken Lasix in the past. Currently he does not have any trouble laying on his bed. Daughter states that he sleeps on a slightly elevated bed. He is gradually improving at home now. He has supplemental Oxygen at home 24/7 and his saturation is usually in the mid to upper 90s. Today his Oxygen saturation is 100%. Daughter wonders if the Oxygen can be stopped in the long run. They have tried hold his Oxygen at times but it usually drops down back into the 80s.     At this point, the lower extremity edema has been improving. He continues to have good urine output at home. Per daughter, they have been weighing him daily since discharge.    Objective   Vital Signs:   /64 (BP Location: Left arm, Patient Position: Sitting, Cuff Size: Adult)   Pulse 84   Temp 96.2 °F (35.7 °C)   Resp 18   Ht 165.1 cm (65\")   Wt 63.5 kg (140 lb)   SpO2 100% Comment: 2 Liters  BMI 23.30 kg/m²     Physical Exam  Vitals reviewed. "   Constitutional:       Appearance: Normal appearance. He is well-developed.   HENT:      Head: Normocephalic and atraumatic.      Mouth/Throat:      Pharynx: No oropharyngeal exudate.   Eyes:      Conjunctiva/sclera: Conjunctivae normal.      Pupils: Pupils are equal, round, and reactive to light.   Cardiovascular:      Rate and Rhythm: Normal rate and regular rhythm.      Pulses: Normal pulses.      Heart sounds: Normal heart sounds. No murmur heard.  No friction rub. No gallop.    Pulmonary:      Effort: Pulmonary effort is normal.      Breath sounds: Normal breath sounds. No wheezing or rhonchi.   Abdominal:      General: There is no distension.   Skin:     General: Skin is warm and dry.   Neurological:      Mental Status: He is alert and oriented to person, place, and time.   Psychiatric:         Mood and Affect: Affect normal.          Result Review :            Procedures      Assessment and Plan    Diagnoses and all orders for this visit:    1. Congestive heart failure, unspecified HF chronicity, unspecified heart failure type (HCC) (Primary)  - We will repeat his probnp. We will also schedule an echo as well to further evaluate his heart function. He should continue on the Oxygen for the time being. Continue checking his weight daily. We will prescribe Lasix as needed for the time being. Continues decreasing his sodium intake as well.   -     Adult Transthoracic Echo Complete w/ Color, Spectral and Contrast if necessary per protocol; Future    2. Hypoxia  -     Adult Transthoracic Echo Complete w/ Color, Spectral and Contrast if necessary per protocol; Future    3. Acute respiratory failure with hypoxia (HCC)  -     Adult Transthoracic Echo Complete w/ Color, Spectral and Contrast if necessary per protocol; Future    4. Age-related osteoporosis without current pathological fracture    Other orders  - We will refill the Januvia, Namenda and Flomax as well.   -     memantine (NAMENDA) 10 MG tablet; Take 1  tablet by mouth 2 (Two) Times a Day for 90 days.  Dispense: 180 tablet; Refill: 0  -     SITagliptin (JANUVIA) 50 MG tablet; Take 1 tablet by mouth Daily for 90 days.  Dispense: 90 tablet; Refill: 0  -     tamsulosin (FLOMAX) 0.4 MG capsule 24 hr capsule; Take 1 capsule by mouth Daily.  Dispense: 90 capsule; Refill: 0  -     furosemide (Lasix) 20 MG tablet; Take 1 tablet by mouth Daily.  Dispense: 30 tablet; Refill: 0              Follow Up   Return in about 4 weeks (around 2/7/2022).  Patient was given instructions and counseling regarding his condition or for health maintenance advice. Please see specific information pulled into the AVS if appropriate.       Transcribed from ambient dictation for ULISSES Corona by Leobardo FRAZIER Rep.  01/10/22   16:35 EST    Patient verbalized consent to the visit recording.

## 2022-01-13 ENCOUNTER — READMISSION MANAGEMENT (OUTPATIENT)
Dept: CALL CENTER | Facility: HOSPITAL | Age: 87
End: 2022-01-13

## 2022-01-13 NOTE — OUTREACH NOTE
CHF Week 2 Survey      Responses   [facility] patient discharged from? [facility]   Does the patient have one of the following disease processes/diagnoses(primary or secondary)? CHF   Week 2 attempt successful? Yes   Call start time 1140   Call end time 1140   Discharge diagnosis acute heart failure exacerbation   Is patient permission given to speak with other caregiver? Yes   List who call center can speak with daughter- Allie   Person spoke with today (if not patient) and relationship daughter   Is the patient taking all medications as directed (includes completed medication regime)? Yes   Does the patient have a primary care provider?  Yes   Comments regarding PCP saw PCP recently   Has the patient kept scheduled appointments due by today? Yes   Has home health visited the patient within 72 hours of discharge? N/A   Psychosocial issues? No   What is the patient's perception of their health status since discharge? Improving   Is the patient able to teach back signs and symptoms of worsening condition? (i.e. weight gain, shortness of air, etc.) Yes   Is the patient/caregiver able to teach back the hierarchy of who to call/visit for symptoms/problems? PCP, Specialist, Home health nurse, Urgent Care, ED, 911 Yes   CHF Week 2 call completed? Yes   Wrap up additional comments Per daughter, patient is doing well, no questions or concerns.          Yesenia Junior RN

## 2022-01-20 ENCOUNTER — READMISSION MANAGEMENT (OUTPATIENT)
Dept: CALL CENTER | Facility: HOSPITAL | Age: 87
End: 2022-01-20

## 2022-01-20 NOTE — OUTREACH NOTE
CHF Week 3 Survey      Responses   [facility] patient discharged from? [facility]   Does the patient have one of the following disease processes/diagnoses(primary or secondary)? CHF   Week 3 attempt successful? Yes   Call start time 1324   Call end time 1328   Discharge diagnosis acute heart failure exacerbation   Person spoke with today (if not patient) and relationship Allie, dejan   Meds reviewed with patient/caregiver? Yes   Is the patient having any side effects they believe may be caused by any medication additions or changes? No   Does the patient have all medications ordered at discharge? Yes   Is the patient taking all medications as directed (includes completed medication regime)? Yes   Does the patient have a primary care provider?  Yes   Does the patient have an appointment with their PCP within 7 days of discharge? Yes   Has the patient kept scheduled appointments due by today? Yes   Has home health visited the patient within 72 hours of discharge? N/A   Pulse Ox monitoring Intermittent   Pulse Ox device source Patient   O2 Sat comments mid to upper 90's on 2L O2   O2 Sat: education provided Sat levels,  Monitoring frequency,  When to seek care   Psychosocial issues? No   Did the patient receive a copy of their discharge instructions? Yes   Nursing interventions Reviewed instructions with patient   What is the patient's perception of their health status since discharge? Improving   Nursing interventions Nurse provided patient education   Is the patient weighing daily? Yes   Does the patient have scales? Yes   Is the patient able to teach back Heart Failure diet management? Yes   Is the patient able to teach back Heart Failure Zones? Yes   Is the patient able to teach back signs and symptoms of worsening condition? (i.e. weight gain, shortness of air, etc.) Yes   If the patient is a current smoker, are they able to teach back resources for cessation? Not a smoker   Is the patient/caregiver able to  teach back the hierarchy of who to call/visit for symptoms/problems? PCP, Specialist, Home health nurse, Urgent Care, ED, 911 Yes   Additional teach back comments states weight stable, no changes,    CHF Week 3 call completed? Yes          Geraldine Jules RN

## 2022-01-24 ENCOUNTER — HOSPITAL ENCOUNTER (OUTPATIENT)
Dept: CARDIOLOGY | Facility: HOSPITAL | Age: 87
Discharge: HOME | End: 2022-01-24
Admitting: NURSE PRACTITIONER

## 2022-01-24 DIAGNOSIS — J96.01 ACUTE RESPIRATORY FAILURE WITH HYPOXIA: ICD-10-CM

## 2022-01-24 DIAGNOSIS — I50.9 CONGESTIVE HEART FAILURE, UNSPECIFIED HF CHRONICITY, UNSPECIFIED HEART FAILURE TYPE: ICD-10-CM

## 2022-01-24 DIAGNOSIS — R09.02 HYPOXIA: ICD-10-CM

## 2022-01-24 PROCEDURE — 93306 TTE W/DOPPLER COMPLETE: CPT

## 2022-01-24 PROCEDURE — 93306 TTE W/DOPPLER COMPLETE: CPT | Performed by: INTERNAL MEDICINE

## 2022-01-28 ENCOUNTER — READMISSION MANAGEMENT (OUTPATIENT)
Dept: CALL CENTER | Facility: HOSPITAL | Age: 87
End: 2022-01-28

## 2022-01-28 LAB
AORTIC DIMENSIONLESS INDEX: 59 (DI)
BH CV ECHO MEAS - AO MAX PG: 6 MMHG
BH CV ECHO MEAS - AO MEAN PG: 3 MMHG
BH CV ECHO MEAS - AO ROOT DIAM: 2 CM
BH CV ECHO MEAS - AO V2 MAX: 130 CM/SEC
BH CV ECHO MEAS - AVA PLANIMETRY TRACED: 1.8 CM2
BH CV ECHO MEAS - EF(MOD-BP): 64 %
BH CV ECHO MEAS - IVSD: 1 CM
BH CV ECHO MEAS - LA DIMENSION(2D): 3.3 CM
BH CV ECHO MEAS - LAT PEAK E' VEL: 4 CM/SEC
BH CV ECHO MEAS - LV V1 MAX: 75 CM/SEC
BH CV ECHO MEAS - LVIDD: 3.5 CM
BH CV ECHO MEAS - LVIDS: 1.7 CM
BH CV ECHO MEAS - LVOT DIAM: 2 CM
BH CV ECHO MEAS - LVPWD: 1.1 CM
BH CV ECHO MEAS - MED PEAK E' VEL: 5 CM/SEC
BH CV ECHO MEAS - MV A MAX VEL: 25 CM/SEC
BH CV ECHO MEAS - MV DEC TIME: 213 MSEC
BH CV ECHO MEAS - MV E MAX VEL: 106 CM/SEC
BH CV ECHO MEAS - MV E/A: 4.2
BH CV ECHO MEASUREMENTS AVERAGE E/E' RATIO: 23.56
IVRT: 61 MSEC
LEFT ATRIUM VOLUME INDEX: 29 ML/M2
MAXIMAL PREDICTED HEART RATE: 131 BPM
STRESS TARGET HR: 111 BPM

## 2022-01-28 NOTE — OUTREACH NOTE
CHF Week 4 Survey      Responses   [facility] patient discharged from? [facility]   Does the patient have one of the following disease processes/diagnoses(primary or secondary)? CHF   Week 4 attempt successful? Yes   Call start time 1735   Call end time 1740   Discharge diagnosis acute heart failure exacerbation   Is patient permission given to speak with other caregiver? Yes   List who call center can speak with daughter- Allie   Person spoke with today (if not patient) and relationship Allie, daughter   Meds reviewed with patient/caregiver? Yes   Is the patient having any side effects they believe may be caused by any medication additions or changes? No   Is the patient taking all medications as directed (includes completed medication regime)? Yes   Has the patient kept scheduled appointments due by today? Yes   DME comments O2 at 2 lpm per nc   Pulse Ox monitoring Intermittent   Pulse Ox device source Patient   O2 Sat comments 95% on 2 liters per NC.    O2 Sat: education provided Sat levels,  Monitoring frequency,  When to seek care   O2 Sat education comments Seek immediate medical attention for oxygen sats less than 88%-90% that do not improve with rest and oxygen   Psychosocial issues? No   Nursing interventions Nurse provided patient education   Is the patient weighing daily? Yes   Does the patient have scales? Yes   Daily weight interventions Education provided on importance of daily weight   Is the patient able to teach back Heart Failure diet management? Yes   Is the patient able to teach back Heart Failure Zones? Yes  [green zone. He is doing well. ]   Is the patient able to teach back signs and symptoms of worsening condition? (i.e. weight gain, shortness of air, etc.) Yes   If the patient is a current smoker, are they able to teach back resources for cessation? Not a smoker   Is the patient/caregiver able to teach back the hierarchy of who to call/visit for symptoms/problems? PCP, Specialist, Home  health nurse, Urgent Care, ED, 911 Yes   Additional teach back comments His weight is doing well.    Week 4 Call Completed? Yes   Would the patient like one additional call? No   Graduated Yes   Is the patient interested in additional calls from an ambulatory ?  NOTE:  applies to high risk patients requiring additional follow-up. Yes   Did the patient feel the follow up calls were helpful during their recovery period? Yes   Was the number of calls appropriate? Yes          Samra Mullins RN

## 2022-02-02 ENCOUNTER — PATIENT OUTREACH (OUTPATIENT)
Dept: CASE MANAGEMENT | Facility: OTHER | Age: 87
End: 2022-02-02

## 2022-02-02 DIAGNOSIS — R06.09 DYSPNEA ON EXERTION: ICD-10-CM

## 2022-02-02 DIAGNOSIS — J96.01 ACUTE RESPIRATORY FAILURE WITH HYPOXIA: Primary | ICD-10-CM

## 2022-02-02 DIAGNOSIS — J44.1 COPD WITH EXACERBATION: ICD-10-CM

## 2022-02-02 DIAGNOSIS — F03.90 DEMENTIA WITHOUT BEHAVIORAL DISTURBANCE, UNSPECIFIED DEMENTIA TYPE: ICD-10-CM

## 2022-02-02 DIAGNOSIS — M19.90 ARTHRITIS: ICD-10-CM

## 2022-02-02 DIAGNOSIS — G20 PARKINSONISM, UNSPECIFIED PARKINSONISM TYPE: ICD-10-CM

## 2022-02-02 DIAGNOSIS — I50.9 CONGESTIVE HEART FAILURE, UNSPECIFIED HF CHRONICITY, UNSPECIFIED HEART FAILURE TYPE: ICD-10-CM

## 2022-02-02 DIAGNOSIS — I48.0 PAROXYSMAL ATRIAL FIBRILLATION: ICD-10-CM

## 2022-02-02 PROCEDURE — 99490 CHRNC CARE MGMT STAFF 1ST 20: CPT | Performed by: NURSE PRACTITIONER

## 2022-02-02 NOTE — OUTREACH NOTE
Ambulatory Case Management Note    CCM Interim Update    Pt referral came from [facility] center. Called and spoke to daughter. Offered and agreed to CCM. Pt has dementia, sees April. Dr Avalos for podiatry. Laura Kemp PCP. Saw [facility] for cardio many years ago. Recent echo ordered by pcp, will review results 2/7 appt and discuss possible cardio referral. Pt had swelling in legs over weekend so took lasix x3 days. Swelling improved. Goals to breathe better and maintain health. Has not seen lung doctor. Has no inhalers or nebs. Gets SOA and has mild labored breathing but recovers with rest and O2. Wears O2 intermittently, but is suppose to be continuously. When O2 off, O2 levels drops to mid 70s. Pt does not like to wear it and has difficulty comprehending that he needs to wear it. Daughter has camera set up in home to keep eye on pt. [facility] Declined need for home health and PT. Nutrition adequate, per daughter.      SDOH updated and reviewed with the patient during this program:     Financial Resource Strain: Low Risk    • Difficulty of Paying Living Expenses: Not hard at all       Physical Activity: Inactive   • Days of Exercise per Week: 0 days   • Minutes of Exercise per Session: 0 min       Food Insecurity: No Food Insecurity   • Worried About Running Out of Food in the Last Year: Never true   • Ran Out of Food in the Last Year: Never true       Social Connections: Socially Isolated   • Frequency of Communication with Friends and Family: Never   • Frequency of Social Gatherings with Friends and Family: Twice a week   • Attends Latter-day Services: Never   • Active Member of Clubs or Organizations: No   • Attends Club or Organization Meetings: Never   • Marital Status:        Transportation Needs: No Transportation Needs   • Lack of Transportation (Medical): No   • Lack of Transportation (Non-Medical): No       Housing Stability: Low Risk    • Unable to Pay for Housing in the Last Year: No    • Number of Places Lived in the Last Year: 1   • Unstable Housing in the Last Year: No       Stress: No Stress Concern Present   • Feeling of Stress : Only a little         Care Evaluation    Questions/Answers      Most Recent Value   Care Gaps Addressed Diabetic A1C   HbA1c Status Patient Will Complete   HbA1c Completion at [facility] or Other [facility]   HbA1c Comments has pcp appt 2-7, a1c out-dated but wnl   Other Patient Education/Resources  Home Healthcare   Home Healthcare Education Method Verbal  [declined at this time]   Advanced Directives: Patient Has   Medication Adherence Confusion - does not understand care plan   Healthy Lifestyle (Self-Efficacy) other (see comments)  [caregiver eddiewil vitals]          General & Health Literacy Assessment    Questions/Answers      Most Recent Value   Assessment Completed With Children   Living Arrangement Children   Type of Residence Private Residence   Home Care Services Yes  [[facility]]   Equiptment Used at Home Oxygen/Respiratory Treatment,  Walker  [bed rails]   Communication Device No   Other Issues Cognitive Impairment,  Hearing Impairment   Bed or Wheelchair Confined No   Difficulty Keeping Appointments No   Church or Spiritual Beliefs that Impact Treatment No   Chronic Pain Yes  [arthritis and neuropathy]   Chronic Pain Timing Intermittent   Limitation of Routine Activities Due to Chronic Pain Moderate   How often do you have someone help you read hospital materials? Always   How often do you have problems learning about your medical condition because of difficulty understanding written information? Always   How often do you have a problem understanding what is told to you about your medical condition? Always   How confident are you filling out medical forms by yourself? Not at all   Health Literacy Low          Care Plan: Heart Failure   Updates made since 2/2/2022 12:00 AM      Problem: HEART FAILURE DIAGNOSIS KNOWLEDGE DEFICIT        Goal: Patient will verbalize understanding of how heart failure affects the body Completed 2/2/2022   Note:    Pt has dementia, caregiver understands     Task: Educate patient on the mechanics of heart failure Completed 2/2/2022   Responsible User: Neyda Rowe RN      Task: Educate patient on ejection fraction and its importance as it pertains to the heart failure diagnosis (HFrEF or HFpEF) Completed 2/2/2022   Responsible User: Neyda Rowe RN   Note:    Pcp to review echo on 2/7     Goal: Patient will verbalize understanding of health maintenance       Task: Educate patient on importance of treating and maintaining other conditions    Responsible User: Neyda Rowe RN      Problem: FLUID RETENTION/OVERLOAD       Goal: Patient will be able to identify signs and symptoms of fluid retention Completed 2/2/2022   Note:    Caregiver monitors s/s and weight and CP and O2     Task: Educate patient on daily weight tracking and signs of extremity edema Completed 2/2/2022   Responsible User: Neyda Rowe RN      Task: Educate patient on fluid management & how too little or too much affects the heart Completed 2/2/2022   Responsible User: Neyda Rowe RN      Task: Instruct patient to take weight every day and call provider for weight gain of 2 lbs overnight or 5 lbs in a week Completed 2/2/2022   Responsible User: Neyda Rowe RN      Task: Instruct patient to do a daily self-check for symptoms of extra fluid (shortness of breath, weakness, trouble sleeping, body swelling, or dizziness) Completed 2/2/2022   Responsible User: Neyda Rowe RN      Problem: HEART FAILURE MEDICATION ADHERENCE       Goal: Consistently take heart failure medication as prescribed       Task: Instruct patient to take all medications as prescribed to help reduce and control symptoms and to call provider for any side effects that prevent taking of medications Completed 2/2/2022   Responsible  User: Neyda Rowe RN      Task: Emphasize the importance of medication adherence Completed 2/2/2022   Responsible User: Neyda Rowe RN      Task: Discuss barriers to medication adherence with patient Completed 2/2/2022   Responsible User: Neyda Rowe RN      Task: Provide medication education on most common medications for heart failure Completed 2/2/2022   Responsible User: Neyda Rowe RN      Problem: EXERCISE REGIMEN       Goal: Patient will engage in physical activity safely       Task: Educate patient on benefits of exercise for heart failure Completed 2/2/2022   Responsible User: Neyda Rowe RN      Task: Discuss barriers to exercise Completed 2/2/2022   Responsible User: Neyda Rowe RN   Note:    Pt has dementia and weakness/neuropathy     Problem: HEART FAILURE MAINTENANCE       Goal: Patient will not experience any symptoms of shortness of breath or body swelling over the next 3 months       Task: monitoring and managing CHF Completed 2/2/2022   Responsible User: Neyda Rowe RN      Task: Schedule regular provider visits for close monitoring of CHF. If patient has not seen PCP within the past 6 months, schedule a follow up appointment Completed 2/2/2022   Responsible User: Neyda Rowe RN      Task: Educate patient to monitor symptoms and weight changes, restrict sodium intake, take medications as prescribed, and stay physically active Completed 2/2/2022   Responsible User: Neyda Rowe RN      Problem: HEART FAILURE PROGRESSION AND CARE NEEDS       Goal: Patient will verbalize understanding of heart failure progression       Task: Educate patient on stages of heart failure Completed 2/2/2022   Responsible User: Neyda Rowe RN      Task: Evaluate readiness for advanced care planning and Utilize ACP tools within Epic for discussion and documentation    Responsible User: Neyda Rowe RN Kimberly R Cochran  RN  Ambulatory Case Management    2/2/2022, 10:15 EST

## 2022-02-03 NOTE — PROGRESS NOTES
Can he is no longer on the schedule for today likely due to the weather.  I think it is appropriate to go ahead and place a cardio referral just to make sure there is nothing else we need to be doing for him.  These let me know if his daughter has any other questions or concerns at this point.

## 2022-02-07 ENCOUNTER — OFFICE VISIT (OUTPATIENT)
Dept: INTERNAL MEDICINE | Facility: CLINIC | Age: 87
End: 2022-02-07

## 2022-02-07 VITALS
OXYGEN SATURATION: 100 % | SYSTOLIC BLOOD PRESSURE: 124 MMHG | HEART RATE: 81 BPM | DIASTOLIC BLOOD PRESSURE: 79 MMHG | TEMPERATURE: 98.2 F

## 2022-02-07 DIAGNOSIS — F03.90 DEMENTIA WITHOUT BEHAVIORAL DISTURBANCE, UNSPECIFIED DEMENTIA TYPE: ICD-10-CM

## 2022-02-07 DIAGNOSIS — J96.01 ACUTE RESPIRATORY FAILURE WITH HYPOXIA: ICD-10-CM

## 2022-02-07 DIAGNOSIS — J44.1 COPD WITH EXACERBATION: ICD-10-CM

## 2022-02-07 DIAGNOSIS — E11.65 TYPE 2 DIABETES MELLITUS WITH HYPERGLYCEMIA, WITHOUT LONG-TERM CURRENT USE OF INSULIN: ICD-10-CM

## 2022-02-07 DIAGNOSIS — I50.9 CONGESTIVE HEART FAILURE, UNSPECIFIED HF CHRONICITY, UNSPECIFIED HEART FAILURE TYPE: Primary | ICD-10-CM

## 2022-02-07 LAB
ALBUMIN SERPL-MCNC: 4.1 G/DL (ref 3.5–5.2)
ALBUMIN/GLOB SERPL: 1.4 G/DL
ALP SERPL-CCNC: 115 U/L (ref 39–117)
ALT SERPL W P-5'-P-CCNC: 36 U/L (ref 1–41)
ANION GAP SERPL CALCULATED.3IONS-SCNC: 11.7 MMOL/L (ref 5–15)
AST SERPL-CCNC: 25 U/L (ref 1–40)
BASOPHILS # BLD AUTO: 0.03 10*3/MM3 (ref 0–0.2)
BASOPHILS NFR BLD AUTO: 0.5 % (ref 0–1.5)
BILIRUB SERPL-MCNC: 0.2 MG/DL (ref 0–1.2)
BUN SERPL-MCNC: 24 MG/DL (ref 8–23)
BUN/CREAT SERPL: 22.6 (ref 7–25)
CALCIUM SPEC-SCNC: 9.1 MG/DL (ref 8.6–10.5)
CHLORIDE SERPL-SCNC: 101 MMOL/L (ref 98–107)
CHOLEST SERPL-MCNC: 155 MG/DL (ref 0–200)
CO2 SERPL-SCNC: 27.3 MMOL/L (ref 22–29)
CREAT SERPL-MCNC: 1.06 MG/DL (ref 0.76–1.27)
DEPRECATED RDW RBC AUTO: 44.3 FL (ref 37–54)
EOSINOPHIL # BLD AUTO: 0.39 10*3/MM3 (ref 0–0.4)
EOSINOPHIL NFR BLD AUTO: 6 % (ref 0.3–6.2)
ERYTHROCYTE [DISTWIDTH] IN BLOOD BY AUTOMATED COUNT: 12.6 % (ref 12.3–15.4)
GFR SERPL CREATININE-BSD FRML MDRD: 66 ML/MIN/1.73
GLOBULIN UR ELPH-MCNC: 3 GM/DL
GLUCOSE SERPL-MCNC: 166 MG/DL (ref 65–99)
HBA1C MFR BLD: 7.58 % (ref 4.8–5.6)
HCT VFR BLD AUTO: 35.3 % (ref 37.5–51)
HDLC SERPL-MCNC: 47 MG/DL (ref 40–60)
HGB BLD-MCNC: 11.9 G/DL (ref 13–17.7)
LDLC SERPL CALC-MCNC: 68 MG/DL (ref 0–100)
LDLC/HDLC SERPL: 1.25 {RATIO}
LYMPHOCYTES # BLD AUTO: 2.12 10*3/MM3 (ref 0.7–3.1)
LYMPHOCYTES NFR BLD AUTO: 32.6 % (ref 19.6–45.3)
MCH RBC QN AUTO: 32.5 PG (ref 26.6–33)
MCHC RBC AUTO-ENTMCNC: 33.7 G/DL (ref 31.5–35.7)
MCV RBC AUTO: 96.4 FL (ref 79–97)
MONOCYTES # BLD AUTO: 0.4 10*3/MM3 (ref 0.1–0.9)
MONOCYTES NFR BLD AUTO: 6.2 % (ref 5–12)
NEUTROPHILS NFR BLD AUTO: 3.55 10*3/MM3 (ref 1.7–7)
NEUTROPHILS NFR BLD AUTO: 54.5 % (ref 42.7–76)
PLATELET # BLD AUTO: 138 10*3/MM3 (ref 140–450)
PMV BLD AUTO: 11.1 FL (ref 6–12)
POTASSIUM SERPL-SCNC: 5 MMOL/L (ref 3.5–5.2)
PROT SERPL-MCNC: 7.1 G/DL (ref 6–8.5)
RBC # BLD AUTO: 3.66 10*6/MM3 (ref 4.14–5.8)
SODIUM SERPL-SCNC: 140 MMOL/L (ref 136–145)
TRIGL SERPL-MCNC: 246 MG/DL (ref 0–150)
TSH SERPL DL<=0.05 MIU/L-ACNC: 4.07 UIU/ML (ref 0.27–4.2)
VLDLC SERPL-MCNC: 40 MG/DL (ref 5–40)
WBC NRBC COR # BLD: 6.5 10*3/MM3 (ref 3.4–10.8)

## 2022-02-07 PROCEDURE — 83036 HEMOGLOBIN GLYCOSYLATED A1C: CPT | Performed by: NURSE PRACTITIONER

## 2022-02-07 PROCEDURE — 84443 ASSAY THYROID STIM HORMONE: CPT | Performed by: NURSE PRACTITIONER

## 2022-02-07 PROCEDURE — 99214 OFFICE O/P EST MOD 30 MIN: CPT | Performed by: NURSE PRACTITIONER

## 2022-02-07 PROCEDURE — 80053 COMPREHEN METABOLIC PANEL: CPT | Performed by: NURSE PRACTITIONER

## 2022-02-07 PROCEDURE — 85025 COMPLETE CBC W/AUTO DIFF WBC: CPT | Performed by: NURSE PRACTITIONER

## 2022-02-07 PROCEDURE — 80061 LIPID PANEL: CPT | Performed by: NURSE PRACTITIONER

## 2022-02-07 PROCEDURE — 36415 COLL VENOUS BLD VENIPUNCTURE: CPT | Performed by: NURSE PRACTITIONER

## 2022-02-08 ENCOUNTER — TELEPHONE (OUTPATIENT)
Dept: CASE MANAGEMENT | Facility: OTHER | Age: 87
End: 2022-02-08

## 2022-02-08 DIAGNOSIS — I50.9 CONGESTIVE HEART FAILURE, UNSPECIFIED HF CHRONICITY, UNSPECIFIED HEART FAILURE TYPE: ICD-10-CM

## 2022-02-08 DIAGNOSIS — I48.0 PAROXYSMAL ATRIAL FIBRILLATION: ICD-10-CM

## 2022-02-08 DIAGNOSIS — I73.9 VASCULAR DISEASE, PERIPHERAL: Primary | ICD-10-CM

## 2022-02-08 NOTE — PROGRESS NOTES
Chief Complaint  Follow-up  soa  Subjective          Sage Michael presents to [facility] for a follow-up to review echocardiogram results, lab work, and acute respiratory failure with previous hypoxia. He is accompanied by his daughter, Allie, for today's visit.    History of Present Illness     The patient's daughter denies the patient needs to be seen by cardiology. She adds the patient was given Lasix on 02/05/2022 and 02/06/2022, which was beneficial.     The patient's daughter states that the patient's blood glucose levels have been elevated over the past year. She states that the patient's blood glucose levels are close to 200 mg/dL. She states that she takes his blood glucose levels between 11:30 AM and 12:30 PM. She states that the patient gets up at 6:00 AM and has his breakfast several hours later. She states that the past week, his blood glucose levels have been pretty low. She states that the patient's lowest blood glucose level was 96 mg/dL.     The patients daughter states that the patient was put on Synthroid some time last year and during that time his daily glucose levels had increased but when he stopped taking it, his levels came back down. She believes the Synthroid influenced his glucose level fluctuations.     The patient's daughter states that they have a pulse oximeter at home. She states they have tried taking the patient off of his supplemental oxygen, and his oxygen saturation dropped into the upper 80s mg/dL while the patient was sedentary. She states that it has been off for a few minutes, but it is not a big deal. She states that the patient has not seen pulmonology other than in the hospital.    Objective      Vital Signs:   /79   Pulse 81   Temp 98.2 °F (36.8 °C)   SpO2 100%       Physical Exam  Vitals and nursing note reviewed.   Constitutional:       General: He is not in acute distress.     Appearance: Normal  appearance. He is well-developed.   HENT:      Head: Normocephalic and atraumatic.      Right Ear: External ear normal.      Left Ear: External ear normal.      Nose: Nose normal.      Mouth/Throat:      Mouth: Mucous membranes are moist.   Eyes:      Conjunctiva/sclera: Conjunctivae normal.   Cardiovascular:      Rate and Rhythm: Normal rate and regular rhythm.      Pulses: Normal pulses.      Heart sounds: Normal heart sounds. No murmur heard.  No friction rub. No gallop.    Pulmonary:      Effort: Pulmonary effort is normal. No respiratory distress.      Breath sounds: Normal air entry. No wheezing, rhonchi or rales.   Musculoskeletal:      Cervical back: Neck supple.      Right lower leg: No edema.      Left lower leg: No edema.   Skin:     General: Skin is warm and dry.   Neurological:      General: No focal deficit present.      Mental Status: He is alert and oriented to person, place, and time.      Motor: Motor function is intact.   Psychiatric:         Mood and Affect: Mood normal.         Behavior: Behavior normal.          Result Review :            Data reviewed: Cardiology studies Echocardiogram:     The ejection fraction is 55 to 60 percent.  The left ventricle is normal in size and function.  The left ventricle is normal in size.  The left ventricle is normal in size, wall thickness, and systolic function.  The right atrium is normal in size.  The aortic valve is trileaflet in morphology. There is no significant aortic valve stenosis or regurgitation.  There is no significant valvular disease.    Procedures      Assessment and Plan    Diagnoses and all orders for this visit:    1. Congestive heart failure, unspecified HF chronicity, unspecified heart failure type (HCC) (Primary)    2. COPD with exacerbation (HCC)        - Patient will contact our office if there are any further concerns regarding his oxygen intake.    3. Type 2 diabetes mellitus with hyperglycemia, without long-term current use of  insulin (HCC)  -     CBC & Differential  -     Comprehensive Metabolic Panel  -     Hemoglobin A1c  -     Lipid Panel  -     TSH  -     Cancel: Manual Differential  - The patient will need lab work done for thyroid, cholesterol, liver function and an A1C.     4. Acute respiratory failure with hypoxia (HCC)    5. Dementia without behavioral disturbance, unspecified dementia type (HCC)      - He will follow up in 3 months, sooner if needed.             Follow Up   Return in about 3 months (around 5/7/2022).     Patient was given instructions and counseling regarding his condition or for health maintenance advice. Please see specific information pulled into the AVS if appropriate.       Transcribed from ambient dictation for ULISSES Corona by Aureliano Crump.  02/08/22   11:38 EST    Patient verbalized consent to the visit recording.

## 2022-02-08 NOTE — PROGRESS NOTES
Discussed with pt and daughter, Allie at recent visit. Will hold off on cardiac referral at this time. I can't tell if order was placed or not. We can cancel if referral order is pending

## 2022-02-08 NOTE — TELEPHONE ENCOUNTER
Called for update after PCP appt, no acute needs or concerns. ECHO was wnl, per daughter.    Cardio referral canceled

## 2022-02-11 ENCOUNTER — TELEPHONE (OUTPATIENT)
Dept: INTERNAL MEDICINE | Facility: CLINIC | Age: 87
End: 2022-02-11

## 2022-02-11 NOTE — TELEPHONE ENCOUNTER
informed daughter of lab results. she verbalized understanding.      ----- Message from ULISSES Corona sent at 2/10/2022  5:14 PM EST -----  Labs look pretty good.  Mild anemia present so I would recommend repeating this at next follow-up visit.  A1c has increased quite a bit however it is still below 8 which I think is a good goal for him at this time.  If A1c increases above 8, then medication adjustment may be appropriate.

## 2022-02-24 ENCOUNTER — PATIENT OUTREACH (OUTPATIENT)
Dept: CASE MANAGEMENT | Facility: OTHER | Age: 87
End: 2022-02-24

## 2022-02-24 DIAGNOSIS — I73.9 VASCULAR DISEASE, PERIPHERAL: Primary | ICD-10-CM

## 2022-02-24 DIAGNOSIS — I48.0 PAROXYSMAL ATRIAL FIBRILLATION: ICD-10-CM

## 2022-02-24 DIAGNOSIS — I50.9 CONGESTIVE HEART FAILURE, UNSPECIFIED HF CHRONICITY, UNSPECIFIED HEART FAILURE TYPE: ICD-10-CM

## 2022-02-24 NOTE — OUTREACH NOTE
[facility] End of Month Documentation    This Chronic Medical Management Care Plan for Sage Michael, 89 y.o. male, has been established; monitored and managed; a new plan of care implemented and a new plan of care implemented for the month of February.  A cumulative time of 36  minutes was spent on this patient record this month, including chart review; phone call with relative; electronic communication with primary care provider.    Regarding the patient's problems: has Arthritis; Broken bones; Cataract; Type 2 diabetes mellitus with hyperglycemia, without long-term current use of insulin (HCC); Deafness; Dementia (HCC); Diabetic neuropathy (HCC); Disorder of intestine; Diverticula of colon; Forgetfulness; Esophageal reflux; Hemorrhoid; Hyperlipidemia; Vascular disease, peripheral (HCC); Ingrown nail; Low back pain; Lumbar compression fracture (HCC); Neuropathy; Pain of foot; Parkinson disease ([facility]); Polyneuropathy; Tinea unguium; Late onset Alzheimer's dementia without behavioral disturbance ([facility]); Disequilibrium; Essential tremor; Memory loss; Neuropathy; Parkinsonism ([facility]); Age-related osteoporosis without current pathological fracture; COPD with exacerbation ([facility]); Dyspnea on exertion ; Acute respiratory failure with hypoxia ([facility]); Paroxysmal atrial fibrillation ([facility]); and CHF (congestive heart failure) ([facility]) on their problem list., the following items were addressed: medical records; medications; transitions to medical care and any changes can be found within the plan section of the note.  A detailed listing of time spent for chronic care management is tracked within each outreach encounter.  Current medications include:  has a current medication list which includes the following prescription(s): aspirin, prolia, donepezil, furosemide, freestyle lite, isopropyl alcohol, levothyroxine, lisinopril, memantine, primidone, simvastatin, sitagliptin, tamsulosin, and trazodone. and the patient is reported to be patient  is compliant with medication protocol,  Medications are reported to be effective.   .  All notes on chart for PCP to review.    The patient was monitored remotely for weight; activity level; blood glucose; mood & behavior; medications.    The patient's physical needs include:  help taking medications as prescribed; needs assistance with ADLs; physical healthcare; physician referral; hearing care, possible referral to cardio.     The patient's mental support needs include:  continued support    The patient's cognitive support needs include:  medication; continued support; supervision; household care; needs assistance with ADLs; personal care; health care; caregiver; requires supervision, pt's daughter is caretaker    The patient's psychosocial support needs include:  continued support    The patient's functional needs include: needs assistance for ADLs; physical healthcare, pt's burt lives with pt    The patient's environmental needs include:  not applicable    Care Plan overall comments:  No data recorded    Refer to previous outreach notes for more information on the areas listed above.    Monthly Billing Diagnoses  (I73.9) Vascular disease, peripheral (HCC)    (I48.0) Paroxysmal atrial fibrillation (HCC)    (I50.9) Congestive heart failure, unspecified HF chronicity, unspecified heart failure type (HCC)    Medications   · Medications have been reconciled    Care Plan progress this month:      Recently Modified Care Plans Updates made since 1/24/2022 12:00 AM     Heart Failure         Problem Priority Last Modified     HEART FAILURE DIAGNOSIS KNOWLEDGE DEFICIT --  2/2/2022 10:08 AM by Neyda Rowe RN              Goal Recent Progress Last Modified     Patient will verbalize understanding of how heart failure affects the body --  2/2/2022 10:09 AM by Neyda Rowe RN     Pt has dementia, caregiver understands             Task Due Date Last Modified     Educate patient on the mechanics of heart  failure --  2/2/2022 10:08 AM by Neyda Rowe RN        Educate patient on ejection fraction and its importance as it pertains to the heart failure diagnosis (HFrEF or HFpEF) --  2/2/2022 10:09 AM by Neyda Rowe RN     Pcp to review echo on 2/7             Goal Recent Progress Last Modified     Patient will verbalize understanding of health maintenance --  2/2/2022 10:08 AM by Neyda Rowe RN              Task Due Date Last Modified     Educate patient on importance of treating and maintaining other conditions --  2/2/2022 10:08 AM by Neyda Rowe RN              Problem Priority Last Modified     FLUID RETENTION/OVERLOAD --  2/2/2022 10:08 AM by Neyda Rowe RN              Goal Recent Progress Last Modified     Patient will be able to identify signs and symptoms of fluid retention --  2/2/2022 10:09 AM by Neyda Rowe RN     Caregiver monitors s/s and weight and CP and O2             Task Due Date Last Modified     Educate patient on daily weight tracking and signs of extremity edema --  2/2/2022 10:09 AM by Neyda Rowe RN        Educate patient on fluid management & how too little or too much affects the heart --  2/2/2022 10:09 AM by Neyda Rowe RN        Instruct patient to take weight every day and call provider for weight gain of 2 lbs overnight or 5 lbs in a week --  2/2/2022 10:09 AM by Neyda Rowe RN        Instruct patient to do a daily self-check for symptoms of extra fluid (shortness of breath, weakness, trouble sleeping, body swelling, or dizziness) --  2/2/2022 10:09 AM by Neyda Rowe RN              Problem Priority Last Modified     HEART FAILURE MEDICATION ADHERENCE --  2/2/2022 10:08 AM by Neyda Rowe RN              Goal Recent Progress Last Modified     Consistently take heart failure medication as prescribed --  2/2/2022 10:08 AM by Neyda Rowe RN              Task Due Date Last Modified      Instruct patient to take all medications as prescribed to help reduce and control symptoms and to call provider for any side effects that prevent taking of medications --  2/2/2022 10:10 AM by Neyda Rowe RN        Emphasize the importance of medication adherence --  2/2/2022 10:10 AM by Neyda Rowe RN        Discuss barriers to medication adherence with patient --  2/2/2022 10:10 AM by Neyda Rowe RN        Provide medication education on most common medications for heart failure --  2/2/2022 10:10 AM by Neyda Rowe RN              Problem Priority Last Modified     EXERCISE REGIMEN --  2/2/2022 10:08 AM by Neyda Rowe RN              Goal Recent Progress Last Modified     Patient will engage in physical activity safely --  2/2/2022 10:08 AM by Neyda Rowe RN              Task Due Date Last Modified     Educate patient on benefits of exercise for heart failure --  2/2/2022 10:10 AM by Neyda Rowe RN        Discuss barriers to exercise --  2/2/2022 10:11 AM by Neyda Rowe RN     Pt has dementia and weakness/neuropathy             Problem Priority Last Modified     HEART FAILURE MAINTENANCE --  2/2/2022 10:08 AM by Neyda Rowe RN              Goal Recent Progress Last Modified     Patient will not experience any symptoms of shortness of breath or body swelling over the next 3 months --  2/2/2022 10:08 AM by Neyda Rowe RN              Task Due Date Last Modified     monitoring and managing CHF --  2/2/2022 10:11 AM by Neyda Rowe RN        Schedule regular provider visits for close monitoring of CHF. If patient has not seen PCP within the past 6 months, schedule a follow up appointment --  2/2/2022 10:11 AM by Neyda Rowe RN        Educate patient to monitor symptoms and weight changes, restrict sodium intake, take medications as prescribed, and stay physically active --  2/2/2022 10:11 AM by Neyda Rowe  CHRISTIN CONNOR              Problem Priority Last Modified     HEART FAILURE PROGRESSION AND CARE NEEDS --  2/2/2022 10:08 AM by Neyda Rowe RN              Goal Recent Progress Last Modified     Patient will verbalize understanding of heart failure progression --  2/2/2022 10:08 AM by Neyda Rowe RN              Task Due Date Last Modified     Educate patient on stages of heart failure --  2/2/2022 10:11 AM by Neyda Rowe RN        Evaluate readiness for advanced care planning and Utilize ACP tools within Epic for discussion and documentation --  2/2/2022 10:08 AM by Neyda Rowe RN                      · Current Specialty Plan of Care Status signed by both patient and provider    Instructions   · Patient was provided an electronic copy of care plan  · CCM services were explained and offered and patient has accepted these services.  · Patient has given their written consent to receive CCM services and understands that this includes the authorization of electronic communication of medical information with the other treating providers.  · Patient understands that they may stop CCM services at any time and these changes will be effective at the end of the calendar month and will effectively revocate the agreement of CCM services.  · Patient understands that only one practitioner can furnish and be paid for CCM services during one calendar month.  Patient also understands that there may be co-payment or deductible fees in association with CCM services.  · Patient will continue with at least monthly follow-up calls with the Nurse Navigator.    Neyda Rowe RN  Ambulatory Case Management    2/24/2022, 14:10 EST

## 2022-03-18 ENCOUNTER — HOSPITAL ENCOUNTER (OUTPATIENT)
Dept: INFUSION THERAPY | Facility: HOSPITAL | Age: 87
Setting detail: INFUSION SERIES
Discharge: HOME | End: 2022-03-18

## 2022-03-18 ENCOUNTER — TELEPHONE (OUTPATIENT)
Dept: INTERNAL MEDICINE | Facility: CLINIC | Age: 87
End: 2022-03-18

## 2022-03-18 VITALS
RESPIRATION RATE: 16 BRPM | SYSTOLIC BLOOD PRESSURE: 127 MMHG | WEIGHT: 137.13 LBS | TEMPERATURE: 97.7 F | BODY MASS INDEX: 22.85 KG/M2 | HEIGHT: 65 IN | OXYGEN SATURATION: 100 % | DIASTOLIC BLOOD PRESSURE: 43 MMHG | HEART RATE: 95 BPM

## 2022-03-18 DIAGNOSIS — M81.0 AGE-RELATED OSTEOPOROSIS WITHOUT CURRENT PATHOLOGICAL FRACTURE: Primary | ICD-10-CM

## 2022-03-18 LAB
ALBUMIN SERPL-MCNC: 4.3 G/DL (ref 3.5–5.2)
ALBUMIN/GLOB SERPL: 1.6 G/DL
ALP SERPL-CCNC: 133 U/L (ref 39–117)
ALT SERPL W P-5'-P-CCNC: 38 U/L (ref 1–41)
ANION GAP SERPL CALCULATED.3IONS-SCNC: 10.2 MMOL/L (ref 5–15)
AST SERPL-CCNC: 19 U/L (ref 1–40)
BILIRUB SERPL-MCNC: 0.2 MG/DL (ref 0–1.2)
BUN SERPL-MCNC: 38 MG/DL (ref 8–23)
BUN/CREAT SERPL: 24.1 (ref 7–25)
CALCIUM SPEC-SCNC: 9.1 MG/DL (ref 8.6–10.5)
CHLORIDE SERPL-SCNC: 104 MMOL/L (ref 98–107)
CO2 SERPL-SCNC: 26.8 MMOL/L (ref 22–29)
CREAT SERPL-MCNC: 1.58 MG/DL (ref 0.76–1.27)
EGFRCR SERPLBLD CKD-EPI 2021: 41.6 ML/MIN/1.73
GLOBULIN UR ELPH-MCNC: 2.7 GM/DL
GLUCOSE SERPL-MCNC: 153 MG/DL (ref 65–99)
MAGNESIUM SERPL-MCNC: 2.4 MG/DL (ref 1.6–2.4)
PHOSPHATE SERPL-MCNC: 3.6 MG/DL (ref 2.5–4.5)
POTASSIUM SERPL-SCNC: 4.8 MMOL/L (ref 3.5–5.2)
PROT SERPL-MCNC: 7 G/DL (ref 6–8.5)
SODIUM SERPL-SCNC: 141 MMOL/L (ref 136–145)

## 2022-03-18 PROCEDURE — 80053 COMPREHEN METABOLIC PANEL: CPT | Performed by: NURSE PRACTITIONER

## 2022-03-18 PROCEDURE — 83735 ASSAY OF MAGNESIUM: CPT | Performed by: NURSE PRACTITIONER

## 2022-03-18 PROCEDURE — G0463 HOSPITAL OUTPT CLINIC VISIT: HCPCS

## 2022-03-18 PROCEDURE — 84100 ASSAY OF PHOSPHORUS: CPT | Performed by: NURSE PRACTITIONER

## 2022-03-18 NOTE — TELEPHONE ENCOUNTER
Red rule verified and correct.    Pt at the hospital for a Prolia injection.    His creatine clearance is 27.9.    Should he reschedule the Prolia?

## 2022-03-18 NOTE — TELEPHONE ENCOUNTER
Consulted with Dr Orantes; recommended if this is an abnormal elevation to hold the dose, if not and he has tolerated it before, go ahead.          Called OT-PT nursing again and relayed message.  They looked at previous clearances and creatine and together it was decided that pt should wait as creatine is more elevated as well.    Appt made for Monday.

## 2022-03-18 NOTE — CODE DOCUMENTATION
Patient did not receive Prolia injection this visit. CrCl was 27.9. Contact PCP, ( Dr. Hernandez) covering for her,Stated to not give prolia and follow up in office on Monday 3/21/22 at 315.

## 2022-03-21 ENCOUNTER — OFFICE VISIT (OUTPATIENT)
Dept: INTERNAL MEDICINE | Facility: CLINIC | Age: 87
End: 2022-03-21

## 2022-03-21 VITALS
OXYGEN SATURATION: 100 % | TEMPERATURE: 96.5 F | BODY MASS INDEX: 23.32 KG/M2 | HEART RATE: 55 BPM | SYSTOLIC BLOOD PRESSURE: 120 MMHG | HEIGHT: 65 IN | DIASTOLIC BLOOD PRESSURE: 64 MMHG | RESPIRATION RATE: 18 BRPM | WEIGHT: 140 LBS

## 2022-03-21 DIAGNOSIS — R00.1 BRADYCARDIA: ICD-10-CM

## 2022-03-21 DIAGNOSIS — E86.0 DEHYDRATION: ICD-10-CM

## 2022-03-21 DIAGNOSIS — F03.90 DEMENTIA WITHOUT BEHAVIORAL DISTURBANCE, UNSPECIFIED DEMENTIA TYPE: ICD-10-CM

## 2022-03-21 DIAGNOSIS — N17.9 AKI (ACUTE KIDNEY INJURY): Primary | ICD-10-CM

## 2022-03-21 DIAGNOSIS — I48.0 PAROXYSMAL ATRIAL FIBRILLATION: ICD-10-CM

## 2022-03-21 LAB
ANION GAP SERPL CALCULATED.3IONS-SCNC: 7.4 MMOL/L (ref 5–15)
BUN SERPL-MCNC: 26 MG/DL (ref 8–23)
BUN/CREAT SERPL: 19 (ref 7–25)
CALCIUM SPEC-SCNC: 9.3 MG/DL (ref 8.6–10.5)
CHLORIDE SERPL-SCNC: 105 MMOL/L (ref 98–107)
CO2 SERPL-SCNC: 27.6 MMOL/L (ref 22–29)
CREAT SERPL-MCNC: 1.37 MG/DL (ref 0.76–1.27)
EGFRCR SERPLBLD CKD-EPI 2021: 49.3 ML/MIN/1.73
GLUCOSE SERPL-MCNC: 112 MG/DL (ref 65–99)
MAGNESIUM SERPL-MCNC: 2.2 MG/DL (ref 1.6–2.4)
POTASSIUM SERPL-SCNC: 5.2 MMOL/L (ref 3.5–5.2)
SODIUM SERPL-SCNC: 140 MMOL/L (ref 136–145)

## 2022-03-21 PROCEDURE — 83735 ASSAY OF MAGNESIUM: CPT | Performed by: NURSE PRACTITIONER

## 2022-03-21 PROCEDURE — 93000 ELECTROCARDIOGRAM COMPLETE: CPT | Performed by: NURSE PRACTITIONER

## 2022-03-21 PROCEDURE — 99214 OFFICE O/P EST MOD 30 MIN: CPT | Performed by: NURSE PRACTITIONER

## 2022-03-21 PROCEDURE — 36415 COLL VENOUS BLD VENIPUNCTURE: CPT | Performed by: NURSE PRACTITIONER

## 2022-03-21 PROCEDURE — 80048 BASIC METABOLIC PNL TOTAL CA: CPT | Performed by: NURSE PRACTITIONER

## 2022-03-21 RX ORDER — DONEPEZIL HYDROCHLORIDE 5 MG/1
5 TABLET, FILM COATED ORAL NIGHTLY
Qty: 90 TABLET | Refills: 1 | Status: SHIPPED | OUTPATIENT
Start: 2022-03-21 | End: 2022-09-17

## 2022-03-21 RX ORDER — TAMSULOSIN HYDROCHLORIDE 0.4 MG/1
1 CAPSULE ORAL DAILY
Qty: 90 CAPSULE | Refills: 0 | Status: SHIPPED | OUTPATIENT
Start: 2022-03-21

## 2022-03-21 RX ORDER — FUROSEMIDE 20 MG/1
20 TABLET ORAL DAILY
Qty: 30 TABLET | Refills: 0 | Status: SHIPPED | OUTPATIENT
Start: 2022-03-21

## 2022-03-21 RX ORDER — LISINOPRIL 2.5 MG/1
2.5 TABLET ORAL DAILY
Qty: 90 TABLET | Refills: 1 | Status: SHIPPED | OUTPATIENT
Start: 2022-03-21 | End: 2022-09-17

## 2022-03-21 RX ORDER — LEVOTHYROXINE SODIUM 0.05 MG/1
50 TABLET ORAL DAILY
Qty: 90 TABLET | Refills: 1 | Status: SHIPPED | OUTPATIENT
Start: 2022-03-21 | End: 2022-09-17

## 2022-03-21 RX ORDER — PRIMIDONE 50 MG/1
100 TABLET ORAL 3 TIMES DAILY
Qty: 540 TABLET | Refills: 1 | Status: SHIPPED | OUTPATIENT
Start: 2022-03-21 | End: 2022-09-17

## 2022-03-21 RX ORDER — MEMANTINE HYDROCHLORIDE 10 MG/1
10 TABLET ORAL 2 TIMES DAILY
Qty: 180 TABLET | Refills: 0 | Status: SHIPPED | OUTPATIENT
Start: 2022-03-21 | End: 2022-06-19

## 2022-03-21 NOTE — PROGRESS NOTES
"Chief Complaint  Follow-up (Elevated creatine clearance), Diabetes, Hypothyroidism, Hyperlipidemia, and Dementia    Subjective          Sage Michael presents to [facility]  History of Present Illness  Last Friday the patient went to outpatient nursing services for prolia injection. Creatinine/GFR were abnormal    No currently taking lasix  Drinking less in the last 2 wks  Denies swelling, soa, chest pain  Daughter voices that he has been acting normal and denies any complaints  Normal UOP, denies tea colored urine. Daughter reports maybe slightly darker yellow.  Objective   Vital Signs:   /64 (BP Location: Right arm, Patient Position: Sitting, Cuff Size: Adult)   Pulse 55   Temp 96.5 °F (35.8 °C)   Resp 18   Ht 165.1 cm (65\")   Wt 63.5 kg (140 lb)   SpO2 100% Comment: 2 liters  BMI 23.30 kg/m²     Physical Exam  Vitals and nursing note reviewed.   Constitutional:       General: He is not in acute distress.     Appearance: Normal appearance.   HENT:      Head: Normocephalic and atraumatic.      Right Ear: External ear normal.      Left Ear: External ear normal.      Nose: Nose normal.      Mouth/Throat:      Mouth: Mucous membranes are moist.   Eyes:      Conjunctiva/sclera: Conjunctivae normal.   Cardiovascular:      Rate and Rhythm: Regular rhythm.      Heart sounds: Normal heart sounds. No murmur heard.    No friction rub. No gallop.      Comments: bradycardic  Pulmonary:      Effort: Pulmonary effort is normal. No respiratory distress.      Breath sounds: No wheezing, rhonchi or rales.   Musculoskeletal:      Cervical back: Neck supple.   Skin:     General: Skin is warm and dry.   Neurological:      General: No focal deficit present.      Mental Status: He is alert and oriented to person, place, and time.   Psychiatric:         Mood and Affect: Mood normal.         Behavior: Behavior normal.        Result Review :            ECG 12 " Lead    Date/Time: 3/21/2022 1:28 PM  Performed by: Laura Kemp APRN  Authorized by: Laura Kemp APRN   Comparison: compared with previous ECG from 1/3/2022  Comparison to previous ECG: Change--SR with PVC  Rhythm: atrial fibrillation  Rate: bradycardic  BPM: 40  ST Segments: ST segments normal  QRS axis: normal    Clinical impression: normal ECG              Assessment and Plan    Diagnoses and all orders for this visit:    1. BENJIE (acute kidney injury) (HCC) (Primary)  Comments:  likely 2/2 dehydration and decreased po intake. rechecking labs today and then thurs if todays are stable. daughter will push fluids  Orders:  -     Basic Metabolic Panel  -     Basic Metabolic Panel; Future  -     Magnesium  -     Cancel: Basic Metabolic Panel  -     Ambulatory Referral to Home Hospice    2. Bradycardia  Comments:  discussed abnormal EKG, 2/2 dehydration vs heart failure 2/2 end stage dementia. daughter prefers to monitor at home and proceed with [facility] referral  Orders:  -     ECG 12 Lead    3. Dementia without behavioral disturbance, unspecified dementia type (HCC)  Comments:  end of life discussion had with daughter. hospice referral placed  Orders:  -     Ambulatory Referral to Home Hospice    4. Dehydration  Comments:  pushing fluids  Orders:  -     Ambulatory Referral to Home Hospice    5. Paroxysmal atrial fibrillation (HCC)    Other orders  -     donepezil (ARICEPT) 5 MG tablet; Take 1 tablet by mouth Every Night for 180 days.  Dispense: 90 tablet; Refill: 1  -     furosemide (Lasix) 20 MG tablet; Take 1 tablet by mouth Daily.  Dispense: 30 tablet; Refill: 0  -     levothyroxine (Synthroid) 50 MCG tablet; Take 1 tablet by mouth Daily for 180 days.  Dispense: 90 tablet; Refill: 1  -     lisinopril (PRINIVIL,ZESTRIL) 2.5 MG tablet; Take 1 tablet by mouth Daily for 180 days.  Dispense: 90 tablet; Refill: 1  -     memantine (NAMENDA) 10 MG tablet; Take 1 tablet by mouth 2 (Two) Times a Day for 90 days.   Dispense: 180 tablet; Refill: 0  -     primidone (MYSOLINE) 50 MG tablet; Take 2 tablets by mouth 3 (Three) Times a Day for 180 days.  Dispense: 540 tablet; Refill: 1  -     SITagliptin (JANUVIA) 50 MG tablet; Take 1 tablet by mouth Daily for 90 days.  Dispense: 90 tablet; Refill: 0  -     tamsulosin (FLOMAX) 0.4 MG capsule 24 hr capsule; Take 1 capsule by mouth Daily.  Dispense: 90 capsule; Refill: 0              Follow Up   Return in about 2 weeks (around 4/4/2022).  Patient was given instructions and counseling regarding his condition or for health maintenance advice. Please see specific information pulled into the AVS if appropriate.

## 2022-03-22 ENCOUNTER — TELEPHONE (OUTPATIENT)
Dept: INTERNAL MEDICINE | Facility: CLINIC | Age: 87
End: 2022-03-22

## 2022-03-22 NOTE — TELEPHONE ENCOUNTER
Caller: [facility]    Relationship: Other    Best call back number: [phone]    What is the best time to reach you: ANYTIME    Who are you requesting to speak with (clinical staff, provider,  specific staff member): CLINICAL    What was the call regarding: [facility] CALLED TO LET JULIETFRANKY DOWNEY KNOW THAT THEY DID RECEIVE THE REFERRAL FOR HOSPICE AND THEY WILL BE SEEING PATIENT TOMORROW, 03/23/2022, AT 9:30 AM.     Do you require a callback: IF NEEDED

## 2022-03-24 ENCOUNTER — CLINICAL SUPPORT (OUTPATIENT)
Dept: INTERNAL MEDICINE | Facility: CLINIC | Age: 87
End: 2022-03-24

## 2022-03-24 DIAGNOSIS — N17.9 AKI (ACUTE KIDNEY INJURY): Primary | ICD-10-CM

## 2022-03-24 LAB
ANION GAP SERPL CALCULATED.3IONS-SCNC: 8.8 MMOL/L (ref 5–15)
BUN SERPL-MCNC: 22 MG/DL (ref 8–23)
BUN/CREAT SERPL: 18.8 (ref 7–25)
CALCIUM SPEC-SCNC: 9.4 MG/DL (ref 8.6–10.5)
CHLORIDE SERPL-SCNC: 103 MMOL/L (ref 98–107)
CO2 SERPL-SCNC: 27.2 MMOL/L (ref 22–29)
CREAT SERPL-MCNC: 1.17 MG/DL (ref 0.76–1.27)
EGFRCR SERPLBLD CKD-EPI 2021: 59.6 ML/MIN/1.73
GLUCOSE SERPL-MCNC: 140 MG/DL (ref 65–99)
POTASSIUM SERPL-SCNC: 4.9 MMOL/L (ref 3.5–5.2)
SODIUM SERPL-SCNC: 139 MMOL/L (ref 136–145)

## 2022-03-24 PROCEDURE — 80048 BASIC METABOLIC PNL TOTAL CA: CPT | Performed by: NURSE PRACTITIONER

## 2022-03-24 PROCEDURE — 36415 COLL VENOUS BLD VENIPUNCTURE: CPT | Performed by: NURSE PRACTITIONER
